# Patient Record
Sex: MALE
[De-identification: names, ages, dates, MRNs, and addresses within clinical notes are randomized per-mention and may not be internally consistent; named-entity substitution may affect disease eponyms.]

---

## 2018-10-18 NOTE — RAD REPORT
EXAM DESCRIPTION:  MRI - Brain Wo Cont - 10/18/2018 1:01 pm

 

CLINICAL HISTORY:  Facial drooping, stroke-like symptoms

 

COMPARISON:  CT head same date

 

TECHNIQUE:  Sagittal T1-weighted images were obtained along with axial PD, heavily T2-weighted and T2
-FLAIR images. Axial DWI and ADC mapping sequences were also obtained along with coronal heavily T2-w
eighted images.

 

FINDINGS:  No intracranial hemorrhage, mass or acute infarction. There is no edema or shift of midlin
e structures. No extra-axial fluid collections. Gray-matter/white matter junction is preserved. Signa
l voids are seen as a normal finding in the major intracranial vessels. No measurable atrophy or 
jennifer ischemic change. Ventricles are normal. No globe or orbital content abnormality seen.

 

No sella or supra sella abnormality. No tonsillar ectopia.

 

Mastoid air cells and paranasal sinuses are clear.

 

Exam has limitation due to motion. Multiple sequences had to be repeated.

 

IMPRESSION:  No acute infarction changes seen. No hemorrhage, mass, edema or other acute intracranial
 finding.

## 2018-10-18 NOTE — RAD REPORT
EXAM DESCRIPTION:  CT - Head Brain Wo Cont - 10/18/2018 10:39 am

 

CLINICAL HISTORY:  Hypertension, facial droop

 

COMPARISON:  None.

 

TECHNIQUE:  Axial 5 mm thick images of the head were obtained without IV contrast.

 

All CT scans are performed using dose optimization technique as appropriate and may include automated
 exposure control or mA/KV adjustment according to patient size.

 

FINDINGS:  No intracranial hemorrhage, mass, edema or shift of mid-line structures. No acute infarcti
on changes seen. No cortical edema or sulcal effacement. No significant atrophy or chronic ischemic c
hange. Ventricles are normal.

 

Mastoid air cells and visualized portions of the paranasal sinuses are clear.

 

No acute bony findings.

 

 

IMPRESSION:  Negative non-contrast CT head examination for acute finding.

## 2018-10-18 NOTE — ER
Nurse's Notes                                                                                     

 [facility]                                                                

Name: Gregory Garvin                                                                            

Age: 58 yrs                                                                                       

Sex: Male                                                                                         

: 1960                                                                                   

MRN: [id number]                                                                                   

Arrival Date: 10/18/2018                                                                          

Time: 10:24                                                                                       

Account#: [id number]                                                                            

Bed 4                                                                                             

Private MD:                                                                                       

Diagnosis: Subconjunctival Hemorrhage;Hypertension                                                

                                                                                                  

Presentation:                                                                                     

10/18                                                                                             

10:29 Presenting complaint: EMS states: Patient's supervisor pulled him form work because of  ss  

      redness to R sclera. Pt has no complaints at this time. EMS reported R sided facial         

      droop, but on arrival to ED, there was no drooping noted. Transition of care: patient       

      was not received from another setting of care. Onset of symptoms was 2018.      

      Risk Assessment: Do you want to hurt yourself or someone else? Patient reports no           

      desire to harm self or others. Initial Sepsis Screen: Does the patient meet any 2           

      criteria? HR > 90 bpm. Does the patient have a suspected source of infection? No.           

      Patient's initial sepsis screen is negative. Note PT reports he woke up this morning        

      with the redness to his sclera. Care prior to arrival: None.                                

10:29 Method Of Arrival: EMS: [facility]                                                    ss  

10:29 Acuity: MARTIN 3                                                                           ss  

                                                                                                  

Historical:                                                                                       

- Allergies:                                                                                      

10:28 NKA;                                                                                    iw  

- PMHx:                                                                                           

10:28 Diabetes - NIDDM; Hypertension; Asthma;                                                 iw  

- PSHx:                                                                                           

10:28 None;                                                                                   iw  

                                                                                                  

- Immunization history:: Adult Immunizations not immunized.                                       

- Social history:: Smoking status: Patient/guardian denies using tobacco.                         

- Ebola Screening: : Patient negative for fever greater than or equal to 101.5 degrees            

  Fahrenheit, and additional compatible Ebola Virus Disease symptoms Patient denies               

  exposure to infectious person Patient denies travel to an Ebola-affected area in the            

  21 days before illness onset No symptoms or risks identified at this time.                      

- Family history:: not pertinent.                                                                 

- Hospitalizations: : No recent hospitalization is reported.                                      

                                                                                                  

                                                                                                  

Screening:                                                                                        

10:41 Abuse screen: Denies threats or abuse. Denies injuries from another. Nutritional        iw  

      screening: No deficits noted. Tuberculosis screening: No symptoms or risk factors           

      identified. Fall Risk IV access (20 points).                                                

                                                                                                  

Assessment:                                                                                       

10:32 General: Appears in no apparent distress. Behavior is calm, cooperative. Pain: Denies   iw  

      pain. Neuro: Level of Consciousness is awake, alert, obeys commands, Oriented to            

      person, place, time, situation. Cardiovascular: Capillary refill < 3 seconds in             

      bilateral fingers Patient's skin is warm and dry. Respiratory: Respiratory effort is        

      even, unlabored, Respiratory pattern is regular, symmetrical. GI: Abdomen is                

      non-distended. EENT: Eyes Sclera/Cornea are reddened in outer aspect of conjuctiva of       

      right eye, iris of right eye and inner aspect of conjuctiva of right eye. Derm: Skin is     

      intact, is healthy with good turgor. Musculoskeletal: Range of motion: intact in all        

      extremities.                                                                                

11:31 Reassessment: Patient appears in no apparent distress at this time. Patient and/or      iw  

      family updated on plan of care and expected duration. Pain level reassessed. Patient is     

      alert, oriented x 3, equal unlabored respirations, skin warm/dry/pink. Patient denies       

      pain at this time.                                                                          

12:30 Reassessment: Patient appears in no apparent distress at this time. Patient and/or      iw  

      family updated on plan of care and expected duration. Pain level reassessed. Patient is     

      alert, oriented x 3, equal unlabored respirations, skin warm/dry/pink. pt transported       

      to [facility], with tech.                                                                          

                                                                                                  

Vital Signs:                                                                                      

10:24  / 74; Pulse 114; Resp 18 S; Pulse Ox 99% ; Weight 80.29 kg; Height 5 ft. 8 in.   iw  

      (172.72 cm); Pain 0/10;                                                                     

10:29 Temp 98.6(TE);                                                                          ss  

10:30 Pulse 101;                                                                              iw  

10:42 Pulse 97;                                                                               iw  

11:11  / 84; Pulse 91; Resp 16; Pulse Ox 97% on R/A; Pain 0/10;                         iw  

10:24 Body Mass Index 26.91 (80.29 kg, 172.72 cm)                                               

                                                                                                  

ED Course:                                                                                        

10:24 Patient arrived in ED.                                                                  iw  

10:24 Claus Yost MD is Attending Physician.                                                rn  

10:29 Arm band placed on right wrist.                                                         ss  

10:35 Triage completed.                                                                       ss  

10:35 Patient has correct armband on for positive identification.                             iw  

10:35 Inserted saline lock: 20 gauge in left antecubital area, using aseptic technique.       ss  

      ,using aseptic technique. insertion by E[facility] Anders tech Blood collected.                       

10:36 CT completed. Patient tolerated procedure well. Patient moved to CT via wheelchair.     sj  

      Patient moved back from CT.                                                                 

10:37 CT Head Brain wo Cont In Process Unspecified.                                           EDMS

11:11 Pina Macedo, RN is Primary Nurse.                                                   iw  

11:12 EKG done, by EKG tech. reviewed by Claus Yost MD.                                      dt2 

12:45 Brain Wo Cont In Process Unspecified.                                                   EDMS

12:45 Patient moved to MRI via wheelchair.                                                      

13:18 MRI completed. Patient tolerated well. Patient moved back from MRI.                     em2 

13:32 No provider procedures requiring assistance completed. IV discontinued, intact,         iw  

      bleeding controlled, No redness/swelling at site. Pressure dressing applied.                

                                                                                                  

Administered Medications:                                                                         

10:42 Drug: NS 0.9% 1000 ml Route: IV; Rate: 1000 ml; Site: left antecubital;                   

                                                                                                  

                                                                                                  

Point of Care Testing:                                                                            

      Blood Glucose:                                                                              

10:29 Blood Glucose: 253 mg/dL;                                                                 

      Ranges:                                                                                     

                                                                                                  

Outcome:                                                                                          

13:16 Discharge ordered by MD.                                                                rn  

13:32 Discharged to home ambulatory.                                                          iw  

13:32 Condition: good                                                                             

13:32 Discharge instructions given to patient, Instructed on discharge instructions, follow       

      up and referral plans. Demonstrated understanding of instructions, follow-up care.          

13:34 Patient left the ED.                                                                    iw  

                                                                                                  

Signatures:                                                                                       

Dispatcher [employer]                           EDMS                                                 

Yfn Mcfarland, RN                         RN                                                      

Marylou Gill Susan                                                                                    

Pina Macedo, RN                     RN   iw                                                   

Claus Yost MD MD rn Smirch, Shelby, RN RN ss Montes, Enrique                              em2                                                  

Samreen Corea                             dt2                                                  

                                                                                                  

**************************************************************************************************

## 2018-10-18 NOTE — EDPHYS
Physician Documentation                                                                           

 [facility]                                                                

Name: Gregory Garvin                                                                            

Age: 58 yrs                                                                                       

Sex: Male                                                                                         

: 1960                                                                                   

MRN: [id number]                                                                                   

Arrival Date: 10/18/2018                                                                          

Time: 10:24                                                                                       

Account#: [id number]                                                                            

Bed 4                                                                                             

Private MD:                                                                                       

ED Physician Claus Yost                                                                         

HPI:                                                                                              

10/18                                                                                             

10:40 This 58 yrs old  Male presents to ER via EMS with complaints of eye redness.    rn  

10:40 The patient is experiencing redness, The patient sustained None. to the right eye,      rn  

      caused by an unknown mechanism. Onset: The symptoms/episode began/occurred this             

      morning. Duration: the symptoms are continuous. Aggravated by nothing. Alleviated by        

      nothing. Associated signs and symptoms: Pertinent positives: None. Pertinent negatives:     

      fever, headache. Severity of symptoms: At their worst the symptoms were mild in the         

      emergency department the symptoms are unchanged. The patient has not experienced            

      similar symptoms in the past. Reports woke up today with red eye, otherwise                 

      asymptomatic, went to work, boss saw his eye, had BP checked and was high, called for       

      ambulance, patient denies trauma, no vision changes, EMS reports "very subtle facial        

      droop", patient denies syncope/speech problem/focal weakness or numbness..                  

                                                                                                  

Historical:                                                                                       

- Allergies:                                                                                      

10:28 NKA;                                                                                    iw  

- PMHx:                                                                                           

10:28 Diabetes - NIDDM; Hypertension; Asthma;                                                 iw  

- PSHx:                                                                                           

10:28 None;                                                                                   iw  

                                                                                                  

- Immunization history:: Adult Immunizations not immunized.                                       

- Social history:: Smoking status: Patient/guardian denies using tobacco.                         

- Ebola Screening: : Patient negative for fever greater than or equal to 101.5 degrees            

  Fahrenheit, and additional compatible Ebola Virus Disease symptoms Patient denies               

  exposure to infectious person Patient denies travel to an Ebola-affected area in the            

  21 days before illness onset No symptoms or risks identified at this time.                      

- Family history:: not pertinent.                                                                 

- Hospitalizations: : No recent hospitalization is reported.                                      

                                                                                                  

                                                                                                  

ROS:                                                                                              

10:40 Constitutional: Negative for fever, chills, and weight loss, Eyes: + redness of right   rn  

      eye Neck: Negative for injury, pain, and swelling, Cardiovascular: Negative for chest       

      pain, palpitations, and edema, Respiratory: Negative for shortness of breath, cough,        

      wheezing, and pleuritic chest pain, Abdomen/GI: Negative for abdominal pain, nausea,        

      vomiting, diarrhea, and constipation, MS/Extremity: Negative for injury and deformity,      

      Skin: Negative for injury, rash, and discoloration, Neuro: Negative for headache,           

      weakness, numbness, tingling, and seizure.                                                  

                                                                                                  

Exam:                                                                                             

10:40 Visual Acuity: Visual acuity is within normal limits.                                   rn  

10:40 Constitutional:  This is a well developed, well nourished patient who is awake, alert,      

      and in no acute distress. Appears anxious Head/Face:  Normocephalic, atraumatic. Eyes:      

      + right subconjunctival hemorrhage, EOMI Neck:  Trachea midline, no thyromegaly or          

      masses palpated, and no cervical lymphadenopathy.  Supple, full range of motion without     

      nuchal rigidity, or vertebral point tenderness.  No Meningismus. Cardiovascular:            

      Regular rate and rhythm with a normal S1 and S2.  No gallops, murmurs, or rubs.  Normal     

      PMI, no JVD.  No pulse deficits. Respiratory:  Lungs have equal breath sounds               

      bilaterally, clear to auscultation and percussion.  No rales, rhonchi or wheezes noted.     

       No increased work of breathing, no retractions or nasal flaring. Abdomen/GI:  Soft,        

      non-tender, with normal bowel sounds.  No distension or tympany.  No guarding or            

      rebound.  No evidence of tenderness throughout. MS/ Extremity:  Pulses equal, no            

      cyanosis.  Neurovascular intact.  Full, normal range of motion.  Equal circumference.       

      Neuro:  Awake and alert, GCS 15, oriented to person, place, time, and situation.            

      Cranial nerves II-XII grossly intact.  Motor strength 5/5 in all extremities.  Sensory      

      grossly intact.  Cerebellar exam normal.  No facial droop noted.                            

                                                                                                  

Vital Signs:                                                                                      

10:24  / 74; Pulse 114; Resp 18 S; Pulse Ox 99% ; Weight 80.29 kg; Height 5 ft. 8 in.   iw  

      (172.72 cm); Pain 0/10;                                                                     

10:29 Temp 98.6(TE);                                                                          ss  

10:30 Pulse 101;                                                                              iw  

10:42 Pulse 97;                                                                               iw  

11:11  / 84; Pulse 91; Resp 16; Pulse Ox 97% on R/A; Pain 0/10;                         iw  

10:24 Body Mass Index 26.91 (80.29 kg, 172.72 cm)                                             iw  

                                                                                                  

MDM:                                                                                              

10:24 Patient medically screened.                                                             rn  

13:13 Differential diagnosis: TIA, hypertension, diabetes, subconjunctival hemorrhage.        rn  

13:14 Data reviewed: vital signs, nurses notes, lab test result(s), radiologic studies, and   rn  

      as a result, I will discharge patient. Counseling: I had a detailed discussion with the     

      patient and/or guardian regarding: the historical points, exam findings, and any            

      diagnostic results supporting the discharge/admit diagnosis, lab results, radiology         

      results, the need for outpatient follow up, to return to the emergency department if        

      symptoms worsen or persist or if there are any questions or concerns that arise at          

      home. Special discussion: I discussed with the patient/guardian in detail that at this      

      point there is no indication for admission to the hospital. It is understood, however,      

      that if the symptoms persist or worsen the patient needs to return immediately for          

      re-evaluation. Based on the history and exam findings, there is no indication for           

      further emergent testing or inpatient evaluation. I discussed with the patient/guardian     

      the need to see the primary care provider for further evaluation of the symptoms. ED        

      course: Labs unremarkable except for hyperglycemia, no AG, CT head and MRI negative, +      

      subconjunctival hemorrhage, will dc home with continuation of BP meds and pcp f/u. .        

                                                                                                  

10/18                                                                                             

10:26 Order name: CBC with Diff; Complete Time: 11:                                         rn  

10/18                                                                                             

10:26 Order name: Basic Metabolic Panel; Complete Time: 11:                                 rn  

10/18                                                                                             

10:26 Order name: Protime (+inr); Complete Time: 10:59                                        rn  

10/18                                                                                             

10:26 Order name: Ptt, Activated; Complete Time: 10:59                                        rn  

10/18                                                                                             

10:26 Order name: Troponin (emerg Dept Use Only); Complete Time: 11:51                        rn  

10/18                                                                                             

10:50 Order name: Manual Differential; Complete Time: 11:51                                   EDMS

10/18                                                                                             

10:26 Order name: IV Start; Complete Time: 10:42                                              rn  

10/18                                                                                             

10:26 Order name: CT Head Brain wo Cont; Complete Time: 10:59                                 rn  

10/18                                                                                             

10:26 Order name: EKG; Complete Time: 10:27                                                   rn  

10/18                                                                                             

10:26 Order name: EKG - Nurse/Tech; Complete Time: 10:43                                      rn  

10/18                                                                                             

10:26 Order name: Accucheck; Complete Time: 10:43                                             rn  

10/18                                                                                             

11:11 Order name: Brain Wo Cont; Complete Time: 13:13                                         EDMS

                                                                                                  

Administered Medications:                                                                         

10:42 Drug: NS 0.9% 1000 ml Route: IV; Rate: 1000 ml; Site: left antecubital;                   

                                                                                                  

                                                                                                  

Point of Care Testing:                                                                            

      Blood Glucose:                                                                              

10:29 Blood Glucose: 253 mg/dL;                                                                 

      Ranges:                                                                                     

      Critical Glucose Levels:Adult <50 mg/dl or >400 mg/dl  <40 mg/dl or >180 mg/dl       

Disposition:                                                                                      

10/18/18 13:16 Discharged to Home. Impression: Subconjunctival Hemorrhage, Hypertension.          

- Condition is Stable.                                                                            

- Discharge Instructions: Hypertension, Subconjunctival Hemorrhage.                               

                                                                                                  

- Medication Reconciliation Form, Thank You Letter, Antibiotic Education, Prescription            

  Opioid Use form.                                                                                

- Follow up: Private Physician; When: As needed; Reason: Recheck today's complaints,              

  Re-evaluation by your physician.                                                                

- Problem is new.                                                                                 

- Symptoms have improved.                                                                         

                                                                                                  

                                                                                                  

                                                                                                  

Signatures:                                                                                       

Dispatcher [employer]                           EDMS                                                 

Yfn Mcfarland RN RN   sg                                                   

Pina Macedo RN RN   iw                                                   

Claus Yost MD MD   rn                                                   

                                                                                                  

Corrections: (The following items were deleted from the chart)                                    

13:34 13:16 10/18/2018 13:16 Discharged to Home. Impression: Subconjunctival Hemorrhage;      iw  

      Hypertension. Condition is Stable. Forms are Medication Reconciliation Form, Thank You      

      Letter, Antibiotic Education, Prescription Opioid Use. Follow up: Private Physician;        

      When: As needed; Reason: Recheck today's complaints, Re-evaluation by your physician.       

      Problem is new. Symptoms have improved. rn                                                  

                                                                                                  

**************************************************************************************************

## 2018-10-18 NOTE — XMS REPORT
Clinical Summary

 Created on:2018



Patient:Gregory Belcher

Sex:Male

:1960

External Reference #:[id number]





Demographics







 Address  [address]



   [place] [address]

 

 Home Phone  [phone]

 

 Preferred Language  English

 

 Marital Status  Unknown

 

 Taoist Affiliation  Unknown

 

 Race  White

 

 Ethnic Group   or 









Author







 Organization  [facility]

 

 Address  [address] [place] [address]

 

 Phone  [phone]









Support







 Name  Relationship  Address  Phone

 

 Unavailable  Unavailable  Unavailable  [phone]

 

 Unavailable  Unavailable  Unavailable  Unavailable









Care Team Providers







 Name  Role  Phone

 

 Unavailable  Primary Care Provider  Unavailable









Allergies

No Known Allergies



Current Medications







 Prescription  Sig.  Disp.  Refills  Start Date  End Date  Status

 

 metFORMIN  Take by mouth 2          Active



 (GLUCOPHAGE) 500 MG  (two) times daily          



 tabletIndications:  with breakfast          



 type 2 diabetes  and dinner Pt          



 mellitus  does not know          



   what dose .          

 

 aspirin 81 MG  Take 1 tablet (81  90 tablet  0  2018  Active



 chewable tablet  mg total) by          



   mouth daily.          

 

 carvedilol (COREG)  Take 1 tablet  180 tablet  1  2018  
Active



 3.125 MG tablet  (3.125 mg total)          



   by mouth 2 (two)          



   times daily.          

 

 digoxin (LANOXIN)  Take 1 tablet  90 tablet  1  2018  Active



 0.125 MG tablet  (125 mcg total)          



   by mouth daily.          

 

 furosemide (LASIX)  Take 1 tablet (20  90 tablet  1  2018  
Active



 20 MG tablet  mg total) by          



   mouth daily.          







Active Problems







 Problem  Noted Date

 

 Congestive heart failure (CHF) (HCC)  2017







Encounters







 Date  Type  Specialty  Care Team  Description

 

 2018  Procedure Pass      

 

 2018  Surgery    Dangelo  R & L HEART CATH



       MD Alexandra  ONLY - NO ANGIOS

 

 2017 -  Hospital Encounter  Intensive Care  Evette,  Acute systolic



 2018      MD Kevin  congestive heart



         failure (HCC)



         (Primary Dx);NSTEMI



         (non-ST elevated



         myocardial



         infarction)



         ([facility]);Type 2



         diabetes mellitus



         without



         complication,



         without long-term



         current use of



         insulin



         ([facility]);Cardiomyopathy



         , unspecified type



         ([facility])

 

 2017  Orders Only  General Internal    



     Medicine    



after 10/17/2017



Social History







 Tobacco Use  Types  Packs/Day  Years Used  Date

 

 Never Smoker        









 Smokeless Tobacco: Never Used      









 Sex Assigned at Birth  Date Recorded

 

 Not on file  







Last Filed Vital Signs







 Vital Sign  Reading  Time Taken

 

 Blood Pressure  91/62  2018  7:40 PM CST

 

 Pulse  110  2018  7:40 PM CST

 

 Temperature  37.4 C (99.4 F)  2018  7:40 PM CST

 

 Respiratory Rate  22  2018  7:40 PM CST

 

 Oxygen Saturation  95%  2018  7:40 PM CST

 

 Inhaled Oxygen Concentration  -  -

 

 Weight  71.1 kg (156 lb 12 oz)  2018  3:00 AM CST

 

 Height  170.2 cm (5' 7")  2017  6:00 PM CST

 

 Body Mass Index  24.55  2018  3:00 AM CST







Plan of Treatment

Not on file



Implants







 Implanted  Type  Area    Device  Expiration  Model /



         Identifier  Date  Serial /



             Lot

 

 Closure Sys Perclose Progl 6fr 01019-99 - [id number]  Cardiovascular  N/A:  
RITCHIE  [id number]  2019  45124-83 /



 Implanted: Qty: 1 on 2018 by Alexandra Pierson MD Groin  LAB:VASC DEV   
    /







Procedures







 Procedure Name  Priority  Date/Time  Associated Diagnosis  Comments

 

 R & L HEART CATH ONLY -    2018  2:30 PM CST  chest pain  



 NO ANGIOS        



after 10/17/2017



Results

EKG-SCANNED (2018 12:10 PM)RHYTHM STRIP - SCAN (2018 12:10 PM)
VASCULAR DIAGRAM -SCAN (2018 12:10 PM)POC-Glucose meter (2018  5:46 
PM)Only the most recent of21 resultswithin the time period is included.





 Component  Value  Ref Range

 

 POC-Glucose Meter  142 (H)Comment: TESTED AT [address]  70 - 
110 mg/dL



   [place] [id number]  









 Specimen  Performing Laboratory

 

 Blood  [facility] [address] [address]



CBC with platelet count + automated diff (2018  4:36 AM)Only the most 
recent of7 resultswithin the time period is included.





 Component  Value  Ref Range

 

 WBC  7.8  3.5 - 10.5 K/L

 

 RBC  4.21 (L)  4.63 - 6.08 M/L

 

 Hemoglobin  12.5 (L)  13.7 - 17.5 GM/DL

 

 Hematocrit  37.7 (L)  40.1 - 51.0 %

 

 MCV  89.5  79.0 - 92.2 fL

 

 MCH  29.7  25.7 - 32.2 pg

 

 MCHC  33.2  32.3 - 36.5 GM/DL

 

 RDW  14.8 (H)  11.6 - 14.4 %

 

 Platelets  210  150 - 450 K/CU MM

 

 MPV  10.3  9.4 - 12.4 fL

 

 nRBC  0  0 - 0 /100 WBC

 

 % Neutros  69  %

 

 % Lymphs  16  %

 

 % Monos  9  %

 

 % Eos  1  %

 

 % Baso  1  %

 

 # Neutros  5.38  1.78 - 5.38 K/L

 

 # Lymphs  1.25 (L)  1.32 - 3.57 K/L

 

 # Monos  0.66  0.30 - 0.82 K/L

 

 # Eos  0.06  0.04 - 0.54 K/L

 

 # Baso  0.09 (H)  0.01 - 0.08 K/L

 

 Immature Granulocytes-Relative  4 (H)  0 - 1 %









 Specimen  Performing Laboratory

 

 Blood  [address] [address]



CBC with platelet count + automated diff (2018  4:36 AM)Only the most 
recent of7 resultswithin the time period is included.





 Specimen  Performing Laboratory

 

 Blood  









 Narrative

 

 The following orders were created for panel order CBC with platelet count + 
automated



 diff.







 Procedure



 Abnormality Status 







 ---------



 ----------- ------ 







 CBC with platelet count ...[[id number]]AbnormalFinal



 result 







  







 Please view results for these tests on the individual orders.



Magnesium (2018  4:36 AM)Only the most recent of7 resultswithin the time 
period is included.





 Component  Value  Ref Range

 

 Magnesium  1.9  1.6 - 2.6 mg/dL









 Specimen  Performing Laboratory

 

 Blood  [address] [address]



Basic metabolic panel (2018  4:36 AM)Only the most recent of7 
resultswithin the time period is included.





 Component  Value  Ref Range

 

 Sodium  135 (L)  136 - 145 meq/L

 

 Potassium  3.8  3.5 - 5.1 meq/L

 

 Chloride  106  98 - 107 meq/L

 

 CO2  22  22 - 29 meq/L

 

 BUN  7  7 - 21 mg/dL

 

 Creatinine  0.49 (L)  0.57 - 1.25 mg/dL

 

 Glucose  101  70 - 105 mg/dL

 

 Calcium  8.4  8.4 - 10.2 mg/dL

 

 EGFR  177Comment: ESTIMATED GFR IS NOT AS ACCURATE AS  mL/min/1.73 sq m



   CREATININE CLEARANCE IN PREDICTING GLOMERULAR FILTRATION  



   RATE. ESTIMATED GFR IS NOT APPLICABLE FOR DIALYSIS  



   PATIENTS.  









 Specimen  Performing Laboratory

 

 Blood  [facility] [address] [address]



MR cardiac without &amp; with IV contrast (2018  4:29 PM)





 Specimen  Performing Laboratory

 

   [employer]









 Narrative

 

 FINAL REPORT







  







 PATIENT ID: [id number]







  







 Cardiac MRI dated 2017







  







 INDICATION: This is a 57 year-old male with known ischemic 







 cardiomyopathy presents for assessment. Recent angiography 







 demonstrate no coronary artery disease. This study is performed in 







 order to quantitate left ventricular function, and to determine 







 myocardial viability and damage.







  







 TECHNIQUE: Julio [employer] scanner. Morphologic and dynamic cine 







 imaging were performed in multiple projections before and after 







 contrast administration.Thereafter, gadolinium was administered, 







 which was followed by viability/scar imaging.Finally, flow 







 quantification sequences were performed to determine the degree of 







 valvular dysfunction.







  







 Please refer to the contrast sheet scanned in the EPIC system for the 







 amount and route of contrast given.







  







 Scanning blood pressure was 90/66. Patient weighs 155 pounds, with 







 height of 67 inches. Body surface area is approximately 1.82 sq m.







  







 FINDINGS: The chest wall and mediastinum appears unremarkable.The 







 pericardium and pulmonary arteries appear normal; no pericardial 







 effusion is identified in the central pulmonary artery is normal in 







 calibre. Limited imaging through the lungs reveals no gross 







 abnormalities; some dependent changes are seen in the lung bases.







 The cardiac chambers demonstrate normal atrioventricular and 







 ventriculoarterial concordance, and systemic and pulmonary venous 







 return.







  







 The thoracic aorta is normal in course, calibre, and contour. There 







 is no evidence of acute aortic pathology, such as dissection, 







 intramural hematoma, or contained rupture. 







  







 The left ventricle is normal and enlarged with severely global 







 hypokinesis/akinesis.Quantitative values are as follows: QZM=546 







 cc; LTS=142 cc; stroke volume=63 cc; and ejection fraction=21%.







 Calculated absolute cardiac output=6.2 liters/min.Absolute left 







 ventricular ylbr=410 grams. Index YOZYE=172 cc/sq m confirming 







 left ventricular enlargement.







  







 No evidence of hypertrophic cardiomyopathy or left ventricular 







 noncompaction noted.







  







 The right ventricle is normal in size with mild systolic dysfunction. 







 Quantitative values are as follows: EGJ=266 cc; ESV=97 cc; and 







 ejection fraction=34%.







  







 Cine imaging and flow quantification unremarkable mitral and aortic 







 valve function. Trace tricuspid regurgitation is present.







  







 Viability/scar imaging reveals no evidence of prior myocardial 







 infarction. However, there is some subtle linear mid myocardial 







 hyperenhancement identified in the basal septum, consistent with 







 interstitial fibrosis, a nonspecific finding, commonly seen in 







 patient with dilated nonischemic cardiomyopathy. Furthermore, this is 







 consistent with raised native myocardial T1 in the experimental T1 







 mapping sequence.







  







 There is substantial reduction in left ventricular long axis strain, 







 -4.3% (normal MRI reference value for male is 16.5 + / - 2.2%).







  







 Ventricular thrombus is not present.







  







 Left atrial enlargement is identified.







  







 CONCLUSIONS:







  







 1. The left ventricle is enlarged with severe global systolic 







 dysfunction. Ejection fraction is quantified to be 21%. 







 Quantitative left ventricular functional values are as described 







 above.







  







 Viability/scar imaging is normal.There is no evidence of prior 







 myocardial damage. No imaging evidence to suggest left ventricular 







 noncompaction.







  







 There is mid myocardial hyperenhancement identified best seen in the 







 basal septum indicating interstitial fibrosis, nonspecific finding in 







 patient with cardiomyopathy.







  







 Substantial reduction in left ventricular long axis strain.







  







 2.The right ventricle is normal in size with overall mild systolic 







 dysfunction. 







  







 Quantitative right ventricular functional veins as described above.







  







 3.Normal aortic and mitral valvular function.







  







 4.Left atrial enlargement.







  







 Signed: Angel Fink MD







 Report Verified Date/Time:2018 16:35:20







  







 Reading Location: [facility] P047 Cardiology MRI







 Electronically signed by: ANGEL FINK M.D. on 2018 04:35 
PM







 









 Procedure Note

 

 Interface, External Ris In - 2018  4:37 PM CST



FINAL REPORT



 



 PATIENT ID:   [id number]



 



 Cardiac MRI dated 2017



 



 INDICATION: This is a 57 year-old male with known ischemic



 cardiomyopathy presents for assessment. Recent angiography



 demonstrate no coronary artery disease. This study is performed in



 order to quantitate left ventricular function, and to determine



 myocardial viability and damage.



 



 TECHNIQUE: Julio [employer]  MRI scanner. Morphologic and dynamic cine



 imaging were performed in multiple projections before and after



 contrast administration.  Thereafter, gadolinium was administered,



 which was followed by viability/scar imaging.  Finally, flow



 quantification sequences were performed to determine the degree of



 valvular dysfunction.



 



 Please refer to the contrast sheet scanned in the EPIC system for the



 amount and route of contrast given.



 



 Scanning blood pressure was 90/66. Patient weighs 155 pounds, with



 height of 67 inches. Body surface area is approximately 1.82 sq m.



 



 FINDINGS: The chest wall and mediastinum appears unremarkable.  The



 pericardium and pulmonary arteries appear normal; no pericardial



 effusion is identified in the central pulmonary artery is normal in



 calibre. Limited imaging through the lungs reveals no gross



 abnormalities; some dependent changes are seen in the lung bases.



 The cardiac chambers demonstrate normal atrioventricular and



 ventriculoarterial concordance, and systemic and pulmonary venous



 return.



 



 The thoracic aorta is normal in course, calibre, and contour. There



 is no evidence of acute aortic pathology, such as dissection,



 intramural hematoma, or contained rupture.



 



 The left ventricle is normal and enlarged with severely global



 hypokinesis/akinesis.  Quantitative values are as follows: GXZ=238



 cc; JCS=455 cc; stroke volume=63 cc; and ejection fraction=21%.



 Calculated absolute cardiac output=6.2 liters/min.  Absolute left



 ventricular rofi=843 grams. Index JUEXV=659 cc/sq m confirming



 left ventricular enlargement.



 



 No evidence of hypertrophic cardiomyopathy or left ventricular



 noncompaction noted.



 



 The right ventricle is normal in size with mild systolic dysfunction.



 Quantitative values are as follows: MLN=660 cc; ESV=97 cc; and



 ejection fraction=34%.



 



 Cine imaging and flow quantification unremarkable mitral and aortic



 valve function. Trace tricuspid regurgitation is present.



 



 Viability/scar imaging reveals no evidence of prior myocardial



 infarction. However, there is some subtle linear mid myocardial



 hyperenhancement identified in the basal septum, consistent with



 interstitial fibrosis, a nonspecific finding, commonly seen in



 patient with dilated nonischemic cardiomyopathy. Furthermore, this is



 consistent with raised native myocardial T1 in the experimental T1



 mapping sequence.



 



 There is substantial reduction in left ventricular long axis strain,



 -4.3% (normal MRI reference value for male is 16.5 + / - 2.2%).



 



 Ventricular thrombus is not present.



 



 Left atrial enlargement is identified.



 



 CONCLUSIONS:



 



 1. The left ventricle is enlarged with severe global systolic



 dysfunction. Ejection fraction is quantified to be 21%.



 Quantitative left ventricular functional values are as described



 above.



 



 Viability/scar imaging is normal.  There is no evidence of prior



 myocardial damage. No imaging evidence to suggest left ventricular



 noncompaction.



 



 There is mid myocardial hyperenhancement identified best seen in the



 basal septum indicating interstitial fibrosis, nonspecific finding in



 patient with cardiomyopathy.



 



 Substantial reduction in left ventricular long axis strain.



 



 2.  The right ventricle is normal in size with overall mild systolic



 dysfunction.



 



 Quantitative right ventricular functional veins as described above.



 



 3.  Normal aortic and mitral valvular function.



 



 4.  Left atrial enlargement.



 



 Signed: Angel Fink MD



 Report Verified Date/Time:  2018 16:35:20



 



 Reading Location: [facility] P047 Cardiology MRI



     Electronically signed by: ANGEL FINK M.D. on 2018 04:35 PM



 



CARDIAC CATH REPORT - SCAN (2018  8:40 PM)ECHOCARDIOGRAM REPORT - SCAN (  2:50 PM)Transthoracic 2D echo w/ doppler (cw/pw/color) (2018 11:
02 AM)





 Component  Value  Ref Range

 

 Ejection Fraction    









 Specimen  Performing Laboratory

 

   [facility] ECHO HEARTLAB MKCKESSON \[facility] [place] [facility]\""









 Narrative

 

 Transthoracic Echocardiography Report (TTE)







  







  Demographics







  







  Patient Name BELCHER, Date of Study 2018







 GREGORY







  







  [id number]




 Male







  







  Visit Number [id number]








  







  [id number] Room Number C721







  Number







  







  Date of Birth1960Referring Physician Kevin Alas MD







  







  Age58 year(s)Sonographer 
Genoveva Navarro







 




 [facility]







  







 Interpreting




 [facility]







 Physician




  Kevin Alas MD







  







 Procedure







  







  Type of







  Study TTE procedure:2DECHO W DOPPLER(CW/PW/COLOR) (ASAP)







  







 Indications:Shortness of breath.







  







 Clinical History







 HGB 14.7







 HCT 45.3 %







 CHF, DM







  







 Height: 67 inches Weight: 72.12 kg (159 lbs) BSA: 1.83 m^2 BMI: 24.9 kg/m^2







  







 HR: 97 bpm BP: 89/66 mmHg







  







  Summary







  Aortic root size (SInus of Valsalva diameter) is mildly dilated .







  The right ventricular chamber size and systolic function are within normal







  limits.







  Unable to estimate peak systolic PA pressure; inadequate TR velocity







  signal.







  No evidence of LV hypertrophy.







  The left ventricle is chamber size (by vol index) is severely enlarged







  (male - LVED vol >100ml/m2).







  The inferior and inferoseptal walls are akinetic.







  Global LV systolic function severely reduced .







  LVEF by Cobos's method of disk assessment is severely reduced (<20%) .







  LV diastolic function is indeterminate.







  LA size is mildly enlarged (35-41 ml/m2) .







  RA size is normal.







  Mild aortic regurgitation.







  Mild mitral regurgitation.







  Unable to estimate peak systolic PA pressure; inadequate TR velocity







  signal







  The estimated RA pressure by IVC dynamics 0-5mmHg .







  







  Signature







  







  ----------------------------------------------------------------







  Electronically signed by Kevin Alas MD(Interpreting







  physician) on 2018 02:29 PM







  ----------------------------------------------------------------







  







  Findings







  







 Technical Quality: Technically adequate exam.







  







  Rhythm/BPSinus tachycardia during the exam.







  







  Left Ventricle No evidence of LV hypertrophy.







 The left ventricle is chamber 
size



 (by vol index)







 is severely enlarged (male - 
LVED vol



 >100ml/m2).







 The inferior and inferoseptal 
walls



 are akinetic.







 Global LV systolic function 
severely



 reduced .







 LVEF by Cobos's method of 
disk



 assessment is







 severely reduced (<20%) .







 LV diastolic function is



 indeterminate.







  







  Left AtriumLA size is mildly enlarged (35-41 ml/m2) .







  







  Right VentricleThe right ventricular chamber size and systolic







 function are within normal 
limits.







  







  Right Atrium RA size is normal.







  







  Aortic Valve Mild AoV cusp thickening.







 Mild aortic regurgitation.







  







  Mitral Valve Normal MV structure.







 Mild mitral regurgitation.







  







  Tricuspid ValveTV structure is normal.







 Unable to estimate peak 
systolic PA



 pressure;







 inadequate TR velocity signal.







  







  Pulmonic Valve Normal PV structure appears normal by available







 views.







 A trace of pulmonary 
regurgitation.







  







  AortaAortic root size (SInus of Valsalva



 diameter) is







 mildly dilated .







 Proximal ascending aorta size 
is



 normal .







  







  PericardiumNo pericardial effusion is visualized.







  







  IVC/SVC/PA/PV/PleuralThe estimated RA pressure by IVC dynamics 0-5mmHg .







  







 Chambers/Structures







  







  Left Atrium







  







  LA Dimension: 3.94 cmLA Area: 22.33 cm^2







  LA Volume: 66.01 ml







  LA Vol. Index: 36 ml/m^2







  







  Left Ventricle







  







  LVIDd: 6.3 cm







  LV Septum Diastolic: 0.81 cm







  LV Septum Systolic: 5.4 cm







  LV PW Diastolic: 1.06 cm







  LVEDV Cobso's:182.24 ml







  LVESV Cobos's:162.8 ml LVEDVI: 100 ml/m^
2







  LVEF Cobos's: 10.7 % LVESVI: 89 ml/m
^2







  







 Aorta







  







  Ao Annulus: 3.57 cmAscending Aorta: 3.35 cm







  







 Doppler/Quantitative Measurements







  







  Mitral Valve







  







  MV Peak E-Wave: 0.7 m/sPeak Gradient: 
1.95 mmHg







  







  MV Austin. Peak:







  







  Tissue Doppler







  







  E' Septal Velocity: 0.08 m/s E/E': 9.14







  







  Aortic Valve







  







  Peak Velocity: 1.2 m/s Mean Velocity: 0.91 m/s







  Peak Gradient: 5.79 mmHg Mean Gradient: 3.68 mmHg







  







  AV VTI: 15.66 cm







  







  AV DVI: 0.47







  







  LVOT







  







  Peak Velocity: 0.56 m/s Peak Gradient: 1.24 mmHg







  Mean Velocity: 0.38 m/s Mean Gradient: 0.7 mmHg







  LVOT 
VTI:



 7.37 cm







 









 Procedure Note

 

 Interface, External Ris In - 2018  2:29 PM CST



Transthoracic Echocardiography Report (TTE)



 



  Demographics



 



  Patient Name   YE,         Date of Study       2018



                 GREGORY



 



  MRN            [id number]        Gender              Male



 



  Visit Number   [id number]      Race                



 



  Accession      [id number]       Room Number         C721



  Number



 



  Date of Birth  1960      Referring Physician Kevin Alas MD



 



  Age            57 year(s)      Sonographer         Genoveva Navarro



                                                     [facility]



 



                                 Interpreting        [facility]



                                 Physician           Kevin Alas MD



 



 Procedure



 



  Type of



  Study     TTE procedure:2DECHO W DOPPLER(CW/PW/COLOR) (ASAP)



 



 Indications:Shortness of breath.



 



 Clinical History



 HGB 14.7



 HCT 45.3 %



 CHF, DM



 



 Height: 67 inches Weight: 72.12 kg (159 lbs) BSA: 1.83 m^2 BMI: 24.9 kg/m^2



 



 HR: 97 bpm BP: 89/66 mmHg



 



  Summary



  Aortic root size (SInus of Valsalva diameter) is mildly dilated .



  The right ventricular chamber size and systolic function are within normal



  limits.



  Unable to estimate peak systolic PA pressure; inadequate TR velocity



  signal.



  No evidence of LV hypertrophy.



  The left ventricle is chamber size (by vol index) is severely enlarged



  (male - LVED vol >100ml/m2).



  The inferior and inferoseptal walls are akinetic.



  Global LV systolic function severely reduced .



  LVEF by Cobos's method of disk assessment is severely reduced (<20%) .



  LV diastolic function is indeterminate.



  LA size is mildly enlarged (35-41 ml/m2) .



  RA size is normal.



  Mild aortic regurgitation.



  Mild mitral regurgitation.



  Unable to estimate peak systolic PA pressure; inadequate TR velocity



  signal



  The estimated RA pressure by IVC dynamics 0-5mmHg .



 



  Signature



 



  ----------------------------------------------------------------



  Electronically signed by Kevin Alas MD(Interpreting



  physician) on 2018 02:29 PM



  ----------------------------------------------------------------



 



  Findings



 



 Technical Quality: Technically adequate exam.



 



  Rhythm/BP              Sinus tachycardia during the exam.



 



  Left Ventricle         No evidence of LV hypertrophy.



                         The left ventricle is chamber size (by vol index)



                         is severely enlarged (male - LVED vol >100ml/m2).



                         The inferior and inferoseptal walls are akinetic.



                         Global LV systolic function severely reduced .



                         LVEF by Cobos's method of disk assessment is



                         severely reduced (<20%) .



                         LV diastolic function is indeterminate.



 



  Left Atrium            LA size is mildly enlarged (35-41 ml/m2) .



 



  Right Ventricle        The right ventricular chamber size and systolic



                         function are within normal limits.



 



  Right Atrium           RA size is normal.



 



  Aortic Valve           Mild AoV cusp thickening.



                         Mild aortic regurgitation.



 



  Mitral Valve           Normal MV structure.



                         Mild mitral regurgitation.



 



  Tricuspid Valve        TV structure is normal.



                         Unable to estimate peak systolic PA pressure;



                         inadequate TR velocity signal.



 



  Pulmonic Valve         Normal PV structure appears normal by available



                         views.



                         A trace of pulmonary regurgitation.



 



  Aorta                  Aortic root size (SInus of Valsalva diameter) is



                         mildly dilated .



                         Proximal ascending aorta size is normal .



 



  Pericardium            No pericardial effusion is visualized.



 



  IVC/SVC/PA/PV/Pleural  The estimated RA pressure by IVC dynamics 0-5mmHg .



 



 Chambers/Structures



 



  Left Atrium



 



  LA Dimension: 3.94 cm                  LA Area: 22.33 cm^2



  LA Volume: 66.01 ml



  LA Vol. Index: 36 ml/m^2



 



  Left Ventricle



 



  LVIDd: 6.3 cm



  LV Septum Diastolic: 0.81 cm



  LV Septum Systolic: 5.4 cm



  LV PW Diastolic: 1.06 cm



  LVEDV Cobos's:182.24 ml



  LVESV Cobos's:162.8 ml                   LVEDVI: 100 ml/m^2



  LVEF Cobos's: 10.7 %                     LVESVI: 89 ml/m^2



 



 Aorta



 



  Ao Annulus: 3.57 cm              Ascending Aorta: 3.35 cm



 



 Doppler/Quantitative Measurements



 



  Mitral Valve



 



  MV Peak E-Wave: 0.7 m/s                  Peak Gradient: 1.95 mmHg



 



  MV Austin. Peak:



 



  Tissue Doppler



 



  E' Septal Velocity: 0.08 m/s             E/E': 9.14



 



  Aortic Valve



 



  Peak Velocity: 1.2 m/s               Mean Velocity: 0.91 m/s



  Peak Gradient: 5.79 mmHg             Mean Gradient: 3.68 mmHg



 



  AV VTI: 15.66 cm



 



  AV DVI: 0.47



 



  LVOT



 



  Peak Velocity: 0.56 m/s             Peak Gradient: 1.24 mmHg



  Mean Velocity: 0.38 m/s             Mean Gradient: 0.7 mmHg



                                      LVOT VTI: 7.37 cm



 



Manual Differential (2018  4:08 AM)Only the most recent of2 resultswithin 
the time period is included.





 Component  Value  Ref Range

 

 Total Counted    

 

 WBC Morphology  Normal  

 

 Platelet Morphology  Normal  

 

 RBC Morphology  Normal  









 Specimen  Performing Laboratory

 

 Blood  [address] [address]



PT/aPTT (2018  4:08 AM)





 Component  Value  Ref Range

 

 Protime  13.1  11.7 - 14.7 seconds

 

 INR  1.0  <=5.9

 

 PTT  28.6  22.5 - 36.0 seconds









 Specimen  Performing Laboratory

 

 Blood  [address] [address]









 Narrative

 

  







 RECOMMENDED COUMADIN/WARFARIN INR THERAPY RANGES







 STANDARD DOSE: 2.0 - 3.0 Includes: PROPHYLAXIS for venous thrombosis, 
systemic



 embolization; TREATMENT for venous thrombosis and/or pulmonary embolus.







 HIGH RISK: Target INR is 2.5-3.5 for patients with mechanical heart valves.



Lipid panel (2018  4:08 AM)





 Component  Value  Ref Range

 

 Triglycerides  222  mg/dL

 

 Cholesterol  214  mg/dL

 

 HDL  29  mg/dL

 

 LDL Calculated  141  mg/dL









 Specimen  Performing Laboratory

 

 Blood  [address] [address]









 Narrative

 

  







 Triglyceride Reference Range:







  Low Risk <150







  [id number]







  High Risk 200-499







  Very High Risk>=500







  







 Cholesterol Reference Range:







  Low Risk <200







  [id number] 







  High Risk>240







  







 HDL Cholesterol Reference Range:







  Low Risk >=60







  High Risk <40







  







 LDL Cholesterol Reference Range:







  Optimal<100







  Near [id number]







  [id number]







  Mres113-211







  Very High >=190



Protein, 24 hour urine (2018 12:16 AM)





 Component  Value  Ref Range

 

 Protein, 24hr Urine  <98  0 - 300 mg/24hr

 

 Volume, Urine  1400  ml

 

 Protein, Urine  <7  0 - 14 mg/dL









 Specimen  Performing Laboratory

 

 Urine - Urine, Voided  [address] [address]



Microalbumin, 24 hour urine (2018 12:16 AM)





 Component  Value  Ref Range

 

 Volume, Urine  1400  ml

 

 Microalbumin, Urine  <0.5  mg/dL

 

 Microalb, 24H Ur  <7  0 - 30 mg/24 hrs









 Specimen  Performing Laboratory

 

 Urine - Urine, Voided  [address] [address]



Urinalysis, Routine (2018  1:17 AM)





 Component  Value  Ref Range

 

 Color, UA  Yellow  

 

 Clarity, UA  Clear  

 

 Specific Gravity, UA  1.017  1.001 - 1.035

 

 pH, UA  7.0  5.0 - 8.0

 

 Protein, UA  Negative  Negative

 

 Glucose, UA  300 mg/dL (A)  Negative

 

 Ketones, UA  10 mg/dL (A)  Negative

 

 Bilirubin, UA  Negative  Negative

 

 Blood, UA  Negative  Negative

 

 Nitrite, UA  Negative  Negative

 

 Leukocytes, UA  Negative  Negative

 

 Urobilinogen, UA  2.0 (H)  0.2 - 1.0 mg/dL

 

 RBC, UA  <1  /HPF

 

 WBC, UA  1  /HPF

 

 Squam Epithel, UA  <1  /HPF

 

 Specimen Source  Urine, Voided  









 Specimen  Performing Laboratory

 

 Urine - Urine, Voided  [address] [address]



Urine culture (2018  1:17 AM)





 Component  Value  Ref Range

 

 Result    

 

 Result  10-19,000 col/mL Candida albicans (A)  









 Specimen  Performing Laboratory

 

 Urine - Urine, Voided  [address] [address]



TSH/T4 if indicated (2017  8:51 PM)





 Component  Value  Ref Range

 

 TSH  1.03  0.35 - 4.94 uIU/mL









 Specimen  Performing Laboratory

 

 Blood - [facility] [address]



Troponin I (2017  8:51 PM)





 Component  Value  Ref Range

 

 Troponin I  0.32 (HH)  0.00 - 0.03 ng/mL









 Specimen  Performing Laboratory

 

 Blood - [facility] [address]









 Narrative

 

  







 Troponin I (TnI) levels must be interpreted in the context of the presenting 
symptoms



 and the clinical findings. Elevated TnI levels indicate myocardial damage, but 
are



 not specific for ischemic heart disease. Elevated TnI levels are seen in 
patients



 with other cardiac conditions (including myocarditis and congestive heart 
failure),



 and slight TnI elevations occur in patients with other conditions, including 
sepsis,



 renal failure, acidosis, acute neurological disease, and persistent 
tachyarrhythmia.



Lactic acid, venous, whole blood (2017  8:51 PM)





 Component  Value  Ref Range

 

 Lactate, Venous  1.2Comment: Specimen moderately hemolyzed  0.5 - 2.2 mmol/L









 Specimen  Performing Laboratory

 

 Blood - Arm, [address] [address]









 Narrative

 

  







 Effective 2016: Units/Reference Range Change







 New: 0.5-2.2 mmol/LPrevious: 5-20 mg/dL



Prothrombin time/INR (2017  8:51 PM)





 Component  Value  Ref Range

 

 Protime  13.1  11.7 - 14.7 seconds

 

 INR  1.0  <=5.9









 Specimen  Performing Laboratory

 

 Blood - Arm, Right  [facility] [address]









 Narrative

 

  







 RECOMMENDED COUMADIN/WARFARIN INR THERAPY RANGES







 STANDARD DOSE: 2.0 - 3.0 Includes: PROPHYLAXIS for venous thrombosis, 
systemic



 embolization; TREATMENT for venous thrombosis and/or pulmonary embolus.







 HIGH RISK: Target INR is 2.5-3.5 for patients with mechanical heart valves.



B-type Natriuretic Factor (BNP) (2017  8:51 PM)





 Component  Value  Ref Range

 

 BNP  825 (H)  0 - 100 pg/mL









 Specimen  Performing Laboratory

 

 Blood - Arm, [address] [address]



Hemoglobin A1c (2017  8:51 PM)





 Component  Value  Ref Range

 

 Hemoglobin A1C  9.2 (H)  4.3 - 6.1 %









 Specimen  Performing Laboratory

 

 Blood - [facility], [address] [address]



Hepatic function panel (2017  8:51 PM)





 Component  Value  Ref Range

 

 Protein, Total  5.9 (L)  6.0 - 8.3 gm/dL

 

 Albumin  3.2 (L)  3.5 - 5.0 g/dL

 

 Total Bilirubin  1.1  0.2 - 1.2 mg/dL

 

 Bilirubin, Direct  0.3  0.1 - 0.5 mg/dL

 

 Alkaline Phosphatase  105  40 - 150 U/L

 

 AST  13  5 - 34 U/L

 

 ALT  37  6 - 55 U/L









 Specimen  Performing Laboratory

 

 Blood - Arm, [address] [address]



Blood culture (2017  8:50 PM)Only the most recent of2 resultswithin the 
time period is included.





 Component  Value  Ref Range

 

 Result  No growth in 5 days  









 Specimen  Performing Laboratory

 

 Blood - Arm, [address] [address]



ECG 12 lead (2017  8:06 PM)





 Specimen  Performing Laboratory

 

   GE [facility]









 Narrative

 

 Ventricular Rate 107 BPM







 Atrial Rate 107 BPM







 P-R Interval 130 ms







 QRS Duration 86 ms







 Q-T Interval 360 ms







 QTC Calculation(Bazett) 480 ms







 P Axis 35 degrees







 R Axis -20 degrees







 T Axis 127 degrees







  







 Sinus tachycardia







 ST & T wave abnormality, consider lateral ischemia







 Abnormal ECG







 No previous ECGs available







 Confirmed by EDWIN REAGAN MICHAEL ([id number]) on 2018 9:49:04 AM









 Procedure Note

 

 Interface, External Ris In - 2018  9:49 AM CST



Ventricular Rate 107 BPM



 Atrial Rate 107 BPM



 P-R Interval 130 ms



 QRS Duration 86 ms



 Q-T Interval 360 ms



 QTC Calculation(Bazett) 480 ms



 P Axis 35 degrees



 R Axis -20 degrees



 T Axis 127 degrees



 



 Sinus tachycardia



 ST & T wave abnormality, consider lateral ischemia



 Abnormal ECG



 No previous ECGs available



 Confirmed by EDWIN REAGAN MICHAEL ([id number]) on 2018 9:49:04 AM



XR chest 1 view portable / bedside (2017  7:45 PM)





 Specimen  Performing Laboratory

 

   GE RIS









 Narrative

 

 FINAL REPORT







  







 PATIENT ID: [id number]







  







  







 History: Shortness of breath.







  







 Comparison: None.







  







 Findings:







  







 A single view of the chest is submitted.







  







 The cardiac silhouette is prominent in size but magnified by low lung 







 volumes and portable technique. 







  







 There is central pulmonary vascular congestion. Bilateral 







 interstitial and patchy mid and lower lung airspace opacities may 







 reflect a combination of atelectasis and edema but pneumonitis should 







 be excluded clinically.







  







 A small right pleural effusion is suspected.







  







 There is no pneumothorax or acute bony abnormality.







  







 Signed: Emil Farooq MD







 Report Verified Date/Time:2017 21:06:26







  







 Reading Location: [address] Reading Room







 Electronically signed by: EMIL FAROOQ M.D. on 2017 09:06 
PM







 









 Procedure Note

 

 Interface, External Ris In - 2017  9:08 PM CST



FINAL REPORT



 



 PATIENT ID:   [id number]



 



 



 History: Shortness of breath.



 



 Comparison: None.



 



 Findings:



 



 A single view of the chest is submitted.



 



 The cardiac silhouette is prominent in size but magnified by low lung



 volumes and portable technique.



 



 There is central pulmonary vascular congestion. Bilateral



 interstitial and patchy mid and lower lung airspace opacities may



 reflect a combination of atelectasis and edema but pneumonitis should



 be excluded clinically.



 



 A small right pleural effusion is suspected.



 



 There is no pneumothorax or acute bony abnormality.



 



 Signed: Emil Farooq MD



 Report Verified Date/Time:  2017 21:06:26



 



 Reading Location: [address] Reading Room



       Electronically signed by: EMIL FAROOQ M.D. on 2017 09:06 PM



 



after 10/17/2017

## 2018-10-18 NOTE — XMS REPORT
Patient Summary Document

 Created on:2018



Patient:MARYANN BELCHER

Sex:Male

:1960

External Reference #:[id number]





Demographics







 Address  [address] [facility] [address]



   [place] [address]

 

 Home Phone  [phone]

 

 Work Phone  [phone]

 

 Preferred Language  Unknown

 

 Marital Status  Unknown

 

 Anabaptism Affiliation  Unknown

 

 Race  Unknown

 

 Additional Race(s)  Unavailable

 

 Ethnic Group  Unknown









Author







 Organization  [facility]

 

 Address  [address] [place] [address] [address]



   [place] [address]

 

 Phone  [phone]









Care Team Providers







 Name  Role  Phone

 

 BONY OVIEDO  Unavailable  Unavailable









Problems

This patient has no known problems.



Allergies, Adverse Reactions, Alerts

This patient has no known allergies or adverse reactions.



Medications

This patient has no known medications.



Results







 Test Description  Test Time  Test Comments  Text Results  Atomic Results  
Result Comments









 POCT-GLUCOSE METER  2018 17:53:00    









   Test Item  Value  Reference Range  Comments









 POC-GLUCOSE METER (BEAKER) (test  142 mg/dL    TESTED AT [address]



 ztxb=4659)      [facility] [address]



POCT-GLUCOSE [id number] 15:28:00





 Test Item  Value  Reference Range  Comments

 

 POC-GLUCOSE METER (BEAKER)  149 mg/dL    TESTED AT [address]



 (test pkqf=7054)      [facility] [address]



[id number] 06:40:00





 Test Item  Value  Reference Range  Comments

 

 MAGNESIUM (BEAKER) (test code=627)  1.9 mg/dL  1.6-2.6  



BASIC METABOLIC [id number] 06:40:00





 Test Item  Value  Reference Range  Comments

 

 SODIUM (BEAKER) (test  135 meq/L  136-145  



 camg=609)      

 

 POTASSIUM (BEAKER) (test  3.8 meq/L  3.5-5.1  



 code=379)      

 

 CHLORIDE (BEAKER) (test  106 meq/L    



 code=382)      

 

 CO2 (BEAKER) (test  22 meq/L  22-29  



 code=355)      

 

 BLOOD UREA NITROGEN  7 mg/dL  7-21  



 (BEAKER) (test code=354)      

 

 CREATININE (BEAKER) (test  0.49 mg/dL  0.57-1.25  



 code=358)      

 

 GLUCOSE RANDOM (BEAKER)  101 mg/dL    



 (test code=652)      

 

 CALCIUM (BEAKER) (test  8.4 mg/dL  8.4-10.2  



 code=697)      

 

 EGFR (BEAKER) (test  177 mL/min/1.73 sq m    ESTIMATED GFR IS NOT AS



 code=1092)      ACCURATE AS CREATININE



       CLEARANCE IN PREDICTING



       GLOMERULAR FILTRATION



       RATE. ESTIMATED GFR IS



       NOT APPLICABLE FOR



       DIALYSIS PATIENTS.



CBC W/PLT COUNT &amp; AUTO [id number] 05:35:00





 Test Item  Value  Reference Range  Comments

 

 WHITE BLOOD CELL COUNT (BEAKER) (test code=775)  7.8 K/ L  3.5-10.5  

 

 RED BLOOD CELL COUNT (BEAKER) (test code=761)  4.21 M/ L  4.63-6.08  

 

 HEMOGLOBIN (BEAKER) (test code=410)  12.5 GM/DL  13.7-17.5  

 

 HEMATOCRIT (BEAKER) (test code=411)  37.7 %  40.1-51.0  

 

 MEAN CORPUSCULAR VOLUME (BEAKER) (test code=753)  89.5 fL  79.0-92.2  

 

 MEAN CORPUSCULAR HEMOGLOBIN (BEAKER) (test  29.7 pg  25.7-32.2  



 caka=758)      

 

 MEAN CORPUSCULAR HEMOGLOBIN CONC (BEAKER) (test  33.2 GM/DL  32.3-36.5  



 code=752)      

 

 RED CELL DISTRIBUTION WIDTH (BEAKER) (test  14.8 %  11.6-14.4  



 code=412)      

 

 PLATELET COUNT (BEAKER) (test code=756)  210 K/CU MM  150-450  

 

 MEAN PLATELET VOLUME (BEAKER) (test code=754)  10.3 fL  9.4-12.4  

 

 NUCLEATED RED BLOOD CELLS (BEAKER) (test  0 /100 WBC  0-0  



 code=413)      

 

 NEUTROPHILS RELATIVE PERCENT (BEAKER) (test  69 %    



 code=429)      

 

 LYMPHOCYTES RELATIVE PERCENT (BEAKER) (test  16 %    



 code=430)      

 

 MONOCYTES RELATIVE PERCENT (BEAKER) (test  9 %    



 code=431)      

 

 EOSINOPHILS RELATIVE PERCENT (BEAKER) (test  1 %    



 code=432)      

 

 BASOPHILS RELATIVE PERCENT (BEAKER) (test  1 %    



 code=437)      

 

 NEUTROPHILS ABSOLUTE COUNT (BEAKER) (test  5.38 K/ L  1.78-5.38  



 code=670)      

 

 LYMPHOCYTES ABSOLUTE COUNT (BEAKER) (test  1.25 K/ L  1.32-3.57  



 code=414)      

 

 MONOCYTES ABSOLUTE COUNT (BEAKER) (test  0.66 K/ L  0.30-0.82  



 code=415)      

 

 EOSINOPHILS ABSOLUTE COUNT (BEAKER) (test  0.06 K/ L  0.04-0.54  



 code=416)      

 

 BASOPHILS ABSOLUTE COUNT (BEAKER) (test  0.09 K/ L  0.01-0.08  



 code=417)      

 

 IMMATURE GRANULOCYTES-RELATIVE PERCENT (BEAKER)  4 %  0-1  



 (test code=2801)      



BLOOD [id number] 05:01:00





 Test Item  Value  Reference Range  Comments

 

 CULTURE (BEAKER) (test code=1095)  No growth in 5 days    



BLOOD [id number] 05:01:00





 Test Item  Value  Reference Range  Comments

 

 CULTURE (BEAKER) (test code=1095)  No growth in 5 days    



POCT-GLUCOSE [id number] 20:57:00





 Test Item  Value  Reference Range  Comments

 

 POC-GLUCOSE METER (BEAKER)  262 mg/dL    TESTED AT [address]



 (test bwiq=1070)      [place] [address]



POCT-GLUCOSE [id number] 18:50:00





 Test Item  Value  Reference Range  Comments

 

 POC-GLUCOSE METER (BEAKER)  102 mg/dL    TESTED AT [address]



 (test zhct=4579)      [place] [address]



POCT-GLUCOSE [id number] 12:47:00





 Test Item  Value  Reference Range  Comments

 

 POC-GLUCOSE METER (BEAKER)  190 mg/dL    TESTED AT [address]



 (test aokl=4547)      [place] [address]



[id number] 05:24:00





 Test Item  Value  Reference Range  Comments

 

 MAGNESIUM (BEAKER) (test code=627)  2.1 mg/dL  1.6-2.6  



BASIC METABOLIC [id number] 05:24:00





 Test Item  Value  Reference Range  Comments

 

 SODIUM (BEAKER) (test  134 meq/L  136-145  



 fomq=882)      

 

 POTASSIUM (BEAKER) (test  3.7 meq/L  3.5-5.1  



 code=379)      

 

 CHLORIDE (BEAKER) (test  105 meq/L    



 code=382)      

 

 CO2 (BEAKER) (test  24 meq/L  22-29  



 code=355)      

 

 BLOOD UREA NITROGEN  7 mg/dL  7-21  



 (BEAKER) (test code=354)      

 

 CREATININE (BEAKER) (test  0.52 mg/dL  0.57-1.25  



 code=358)      

 

 GLUCOSE RANDOM (BEAKER)  106 mg/dL    



 (test code=652)      

 

 CALCIUM (BEAKER) (test  8.0 mg/dL  8.4-10.2  



 code=697)      

 

 EGFR (BEAKER) (test  165 mL/min/1.73 sq m    ESTIMATED GFR IS NOT AS



 code=1092)      ACCURATE AS CREATININE



       CLEARANCE IN PREDICTING



       GLOMERULAR FILTRATION



       RATE. ESTIMATED GFR IS



       NOT APPLICABLE FOR



       DIALYSIS PATIENTS.



CBC W/PLT COUNT &amp; AUTO [id number] 04:52:00





 Test Item  Value  Reference Range  Comments

 

 WHITE BLOOD CELL COUNT (BEAKER) (test code=775)  7.7 K/ L  3.5-10.5  

 

 RED BLOOD CELL COUNT (BEAKER) (test code=761)  4.14 M/ L  4.63-6.08  

 

 HEMOGLOBIN (BEAKER) (test code=410)  12.4 GM/DL  13.7-17.5  

 

 HEMATOCRIT (BEAKER) (test code=411)  37.4 %  40.1-51.0  

 

 MEAN CORPUSCULAR VOLUME (BEAKER) (test code=753)  90.3 fL  79.0-92.2  

 

 MEAN CORPUSCULAR HEMOGLOBIN (BEAKER) (test  30.0 pg  25.7-32.2  



 tggm=582)      

 

 MEAN CORPUSCULAR HEMOGLOBIN CONC (BEAKER) (test  33.2 GM/DL  32.3-36.5  



 code=752)      

 

 RED CELL DISTRIBUTION WIDTH (BEAKER) (test  14.9 %  11.6-14.4  



 code=412)      

 

 PLATELET COUNT (BEAKER) (test code=756)  202 K/CU MM  150-450  

 

 MEAN PLATELET VOLUME (BEAKER) (test code=754)  9.9 fL  9.4-12.4  

 

 NUCLEATED RED BLOOD CELLS (BEAKER) (test  0 /100 WBC  0-0  



 code=413)      

 

 NEUTROPHILS RELATIVE PERCENT (BEAKER) (test  72 %    



 code=429)      

 

 LYMPHOCYTES RELATIVE PERCENT (BEAKER) (test  13 %    



 code=430)      

 

 MONOCYTES RELATIVE PERCENT (BEAKER) (test  7 %    



 code=431)      

 

 EOSINOPHILS RELATIVE PERCENT (BEAKER) (test  1 %    



 code=432)      

 

 BASOPHILS RELATIVE PERCENT (BEAKER) (test  1 %    



 code=437)      

 

 NEUTROPHILS ABSOLUTE COUNT (BEAKER) (test  5.58 K/ L  1.78-5.38  



 code=670)      

 

 LYMPHOCYTES ABSOLUTE COUNT (BEAKER) (test  1.03 K/ L  1.32-3.57  



 code=414)      

 

 MONOCYTES ABSOLUTE COUNT (BEAKER) (test  0.54 K/ L  0.30-0.82  



 code=415)      

 

 EOSINOPHILS ABSOLUTE COUNT (BEAKER) (test  0.11 K/ L  0.04-0.54  



 code=416)      

 

 BASOPHILS ABSOLUTE COUNT (BEAKER) (test  0.06 K/ L  0.01-0.08  



 code=417)      

 

 IMMATURE GRANULOCYTES-RELATIVE PERCENT (BEAKER)  5 %  0-1  



 (test code=2801)      



POCT-GLUCOSE [id number] 22:11:00





 Test Item  Value  Reference Range  Comments

 

 POC-GLUCOSE METER (BEAKER)  131 mg/dL    TESTED AT [place] [id number] [facility]



 (test pmpx=5790)      [place] [address]



POCT-GLUCOSE [id number] 17:52:00





 Test Item  Value  Reference Range  Comments

 

 POC-GLUCOSE METER (BEAKER)  113 mg/dL    TESTED AT [address]



 (test rhyc=2889)      [facility] [address]



MR, CARDIAC, [id number] 16:35:00Reason for exam:-&gt;viabilityFINAL 
REPORT PATIENT ID:   [id number] Cardiac MRI dated 2017 INDICATION: 
This is a 57 year-old male with known ischemic cardiomyopathy presents for 
assessment. Recent angiography demonstrate no coronary artery disease. This 
study is performed in order to quantitate left ventricular function,and to 
determine myocardial viability and damage. TECHNIQUE: [employer]  MRI 
scanner. Morphologic and dynamic cine imaging were performed in multiple 
projections before and after contrast administration.  Thereafter, gadolinium 
was administered, which was followed by viability/scar imaging.  Finally, flow 
quantification sequences were performed to determine the degree of valvular 
dysfunction.   Please refer to the contrast sheet scanned in the EPIC system 
for the amount and route of contrast given. Scanning blood pressure was 90/66. 
Patient weighs 155 pounds, with height of 67 inches. Body surface area is 
approximately 1.82 sq m. FINDINGS: The chest wall and mediastinum appears 
unremarkable.The pericardium and pulmonary arteries appear normal; no 
pericardial effusion is identified in the central pulmonary artery is normal in 
calibre. Limited imaging through the lungs reveals no gross abnormalities; some 
dependent changes are seen in the lung bases.  The cardiac chambers demonstrate 
normal atrioventricular and ventriculoarterial concordance, and systemic and 
pulmonary venous return.   The thoracic aorta is normal in course, calibre, and 
contour. There is no evidence of acute aortic pathology, such as dissection, 
intramural hematoma, or contained rupture.  The left ventricle is normal and 
enlarged with severely global hypokinesis/akinesis.  Quantitative values are as 
follows: DCB=874 cc; RZJ=748 cc; stroke volume=63 cc; and ejection fraction=21%
.  Calculated absolute cardiac output=6.2 liters/min.  Absolute left 
ventricular necy=992 grams. Index UWPCA=650 cc/sq m confirming left ventricular 
enlargement. No evidence of hypertrophic cardiomyopathy or left ventricular 
noncompaction noted. The right ventricle is normal in size with mild systolic 
dysfunction. Quantitative values are as follows: MTR=490 cc; ESV=97 cc; and 
ejection fraction=34%.   Cine imaging and flow quantification unremarkable 
mitral and aortic valve function. Trace tricuspid regurgitation is present. 
Viability/scar imaging reveals no evidence of prior myocardial infarction. 
However, there is some subtle linear mid myocardial hyperenhancement identified 
in the basal septum, consistent with interstitial fibrosis, a nonspecific 
finding, commonly seen in patient with dilated nonischemic cardiomyopathy. 
Furthermore, this is consistent with raised native myocardial T1 in the 
experimental T1 mapping sequence. There is substantial reduction in left 
ventricular long axis strain, -4.3% (normal MRI reference value for male is 
16.5 + / - 2.2%). Ventricular thrombus is not present. Left atrial enlargement 
is identified. CONCLUSIONS: 1. The left ventricle is enlarged with severe 
global systolic dysfunction. Ejection fraction is quantified to be 21%.   
Quantitative left ventricular functional values are as described above.Viability
/scar imaging is normal.  There is no evidence of prior myocardial damage. No 
imaging evidence to suggest left ventricular noncompaction. There is mid 
myocardial hyperenhancement identified best seen in the basal septum indicating 
interstitial fibrosis, nonspecific finding in patient with cardiomyopathy. 
Substantial reduction in left ventricular long axis strain. 2.  The right 
ventricle is normal in size with overall mild systolic dysfunction.  
Quantitative right ventricular functional veins as described above. 3.  Normal 
aortic and mitral valvular function. 4.  Left atrial enlargement. Signed: Angel Fink MDReport Verified Date/Time:  2018 16:35:20 Reading Location: 
[place] [address] Cardiology MRI    Electronically signed by: ANGEL FINK M.D. 
on 2018 04:35 PMPOCT-GLUCOSE [id number] 13:41:00





 Test Item  Value  Reference Range  Comments

 

 POC-GLUCOSE METER (BEAKER)  131 mg/dL    TESTED AT [facility] [address] [facility]



 (test jzfv=3974)      [facility] [address]



POCT-GLUCOSE [id number] 08:39:00





 Test Item  Value  Reference Range  Comments

 

 POC-GLUCOSE METER (BEAKER)  127 mg/dL    TESTED AT [facility] [address] [facility]



 (test kitv=9748)      [facility] [address]



[id number] 06:51:00





 Test Item  Value  Reference Range  Comments

 

 MAGNESIUM (BEAKER) (test code=627)  1.8 mg/dL  1.6-2.6  



BASIC METABOLIC [id number] 06:51:00





 Test Item  Value  Reference Range  Comments

 

 SODIUM (BEAKER) (test  136 meq/L  136-145  



 kflj=892)      

 

 POTASSIUM (BEAKER) (test  4.2 meq/L  3.5-5.1  



 code=379)      

 

 CHLORIDE (BEAKER) (test  107 meq/L    



 code=382)      

 

 CO2 (BEAKER) (test  24 meq/L  22-29  



 code=355)      

 

 BLOOD UREA NITROGEN  6 mg/dL  7-21  



 (BEAKER) (test code=354)      

 

 CREATININE (BEAKER) (test  0.53 mg/dL  0.57-1.25  



 code=358)      

 

 GLUCOSE RANDOM (BEAKER)  111 mg/dL    



 (test code=652)      

 

 CALCIUM (BEAKER) (test  8.1 mg/dL  8.4-10.2  



 code=697)      

 

 EGFR (BEAKER) (test  161 mL/min/1.73 sq m    ESTIMATED GFR IS NOT AS



 code=1092)      ACCURATE AS CREATININE



       CLEARANCE IN PREDICTING



       GLOMERULAR FILTRATION



       RATE. ESTIMATED GFR IS



       NOT APPLICABLE FOR



       DIALYSIS PATIENTS.



CBC W/PLT COUNT &amp; AUTO [id number] 05:57:00





 Test Item  Value  Reference Range  Comments

 

 WHITE BLOOD CELL COUNT (BEAKER) (test code=775)  8.2 K/ L  3.5-10.5  

 

 RED BLOOD CELL COUNT (BEAKER) (test code=761)  4.27 M/ L  4.63-6.08  

 

 HEMOGLOBIN (BEAKER) (test code=410)  12.7 GM/DL  13.7-17.5  

 

 HEMATOCRIT (BEAKER) (test code=411)  38.7 %  40.1-51.0  

 

 MEAN CORPUSCULAR VOLUME (BEAKER) (test code=753)  90.6 fL  79.0-92.2  

 

 MEAN CORPUSCULAR HEMOGLOBIN (BEAKER) (test  29.7 pg  25.7-32.2  



 qjdw=242)      

 

 MEAN CORPUSCULAR HEMOGLOBIN CONC (BEAKER) (test  32.8 GM/DL  32.3-36.5  



 code=752)      

 

 RED CELL DISTRIBUTION WIDTH (BEAKER) (test  14.9 %  11.6-14.4  



 code=412)      

 

 PLATELET COUNT (BEAKER) (test code=756)  207 K/CU MM  150-450  

 

 MEAN PLATELET VOLUME (BEAKER) (test code=754)  10.2 fL  9.4-12.4  

 

 NUCLEATED RED BLOOD CELLS (BEAKER) (test  0 /100 WBC  0-0  



 code=413)      

 

 NEUTROPHILS RELATIVE PERCENT (BEAKER) (test  72 %    



 code=429)      

 

 LYMPHOCYTES RELATIVE PERCENT (BEAKER) (test  14 %    



 code=430)      

 

 MONOCYTES RELATIVE PERCENT (BEAKER) (test  7 %    



 code=431)      

 

 EOSINOPHILS RELATIVE PERCENT (BEAKER) (test  2 %    



 code=432)      

 

 BASOPHILS RELATIVE PERCENT (BEAKER) (test  1 %    



 code=437)      

 

 NEUTROPHILS ABSOLUTE COUNT (BEAKER) (test  5.92 K/ L  1.78-5.38  



 code=670)      

 

 LYMPHOCYTES ABSOLUTE COUNT (BEAKER) (test  1.11 K/ L  1.32-3.57  



 code=414)      

 

 MONOCYTES ABSOLUTE COUNT (BEAKER) (test  0.55 K/ L  0.30-0.82  



 code=415)      

 

 EOSINOPHILS ABSOLUTE COUNT (BEAKER) (test  0.13 K/ L  0.04-0.54  



 code=416)      

 

 BASOPHILS ABSOLUTE COUNT (BEAKER) (test  0.09 K/ L  0.01-0.08  



 code=417)      

 

 IMMATURE GRANULOCYTES-RELATIVE PERCENT (BEAKER)  5 %  0-1  



 (test code=2801)      



POCT-GLUCOSE [id number] 21:08:00





 Test Item  Value  Reference Range  Comments

 

 POC-GLUCOSE METER (BEAKER)  195 mg/dL    TESTED AT [address]



 (test gdid=6388)      [facility] [address]



POCT-GLUCOSE [id number] 18:00:00





 Test Item  Value  Reference Range  Comments

 

 POC-GLUCOSE METER (BEAKER)  175 mg/dL    TESTED AT [address]



 (test tgxz=2207)      [facility] [address]



POCT-GLUCOSE [id number] 12:45:00





 Test Item  Value  Reference Range  Comments

 

 POC-GLUCOSE METER (BEAKER)  183 mg/dL    TESTED AT [address]



 (test czsc=0113)      [facility] [address]



POCT-GLUCOSE [id number] 08:34:00





 Test Item  Value  Reference Range  Comments

 

 POC-GLUCOSE METER (BEAKER)  127 mg/dL    TESTED AT [address]



 (test lrzq=5448)      [facility] [address]



PROTEIN, 24 HOUR [id number] 08:28:00





 Test Item  Value  Reference Range  Comments

 

 PROTEIN, 24HR URINE (BEAKER) (test code=1570)  < mg/24hr  0-300  

 

 VOLUME, TOTAL (BEAKER) (test code=1457)  1400 ml    

 

 PROTEIN, URINE (BEAKER) (test code=1569)  < mg/dL  0-14  



MICROALBUMIN, 24 HOUR [id number] 08:24:00





 Test Item  Value  Reference Range  Comments

 

 VOLUME, TOTAL (BEAKER) (test code=1457)  1400 ml    

 

 MICROALBUMIN URINE (BEAKER) (test code=1794)  < mg/dL    

 

 MICROALBUMIN 24 HOUR URINE (BEAKER) (test  < mg/24 hrs  0-30  



 code=623)      



URINE [id number] 07:44:00





 Test Item  Value  Reference Range  Comments

 

 CULTURE (BEAKER) (test      10-19,000 col/mL Candida



 code=1095)      albicans



[id number] 05:03:00





 Test Item  Value  Reference Range  Comments

 

 MAGNESIUM (BEAKER) (test  1.8 mg/dL  1.6-2.6  Specimen slightly hemolyzed



 code=627)      



BASIC METABOLIC [id number] 05:03:00





 Test Item  Value  Reference Range  Comments

 

 SODIUM (BEAKER) (test  134 meq/L  136-145  



 wvqe=478)      

 

 POTASSIUM (BEAKER) (test  4.3 meq/L  3.5-5.1  Specimen slightly



 code=379)      hemolyzed

 

 CHLORIDE (BEAKER) (test  104 meq/L    



 code=382)      

 

 CO2 (BEAKER) (test  24 meq/L  22-29  



 code=355)      

 

 BLOOD UREA NITROGEN  9 mg/dL  7-21  



 (BEAKER) (test code=354)      

 

 CREATININE (BEAKER) (test  0.55 mg/dL  0.57-1.25  Specimen slightly



 code=358)      hemolyzed

 

 GLUCOSE RANDOM (BEAKER)  104 mg/dL    



 (test code=652)      

 

 CALCIUM (BEAKER) (test  8.1 mg/dL  8.4-10.2  



 code=697)      

 

 EGFR (BEAKER) (test  155 mL/min/1.73 sq m    ESTIMATED GFR IS NOT AS



 code=1092)      ACCURATE AS CREATININE



       CLEARANCE IN PREDICTING



       GLOMERULAR FILTRATION



       RATE. ESTIMATED GFR IS



       NOT APPLICABLE FOR



       DIALYSIS PATIENTS.



CBC W/PLT COUNT &amp; AUTO [id number] 04:09:00





 Test Item  Value  Reference Range  Comments

 

 WHITE BLOOD CELL COUNT (BEAKER) (test code=775)  8.9 K/ L  3.5-10.5  

 

 RED BLOOD CELL COUNT (BEAKER) (test code=761)  4.56 M/ L  4.63-6.08  

 

 HEMOGLOBIN (BEAKER) (test code=410)  13.5 GM/DL  13.7-17.5  

 

 HEMATOCRIT (BEAKER) (test code=411)  41.6 %  40.1-51.0  

 

 MEAN CORPUSCULAR VOLUME (BEAKER) (test code=753)  91.2 fL  79.0-92.2  

 

 MEAN CORPUSCULAR HEMOGLOBIN (BEAKER) (test  29.6 pg  25.7-32.2  



 fcwj=259)      

 

 MEAN CORPUSCULAR HEMOGLOBIN CONC (BEAKER) (test  32.5 GM/DL  32.3-36.5  



 code=752)      

 

 RED CELL DISTRIBUTION WIDTH (BEAKER) (test  15.0 %  11.6-14.4  



 code=412)      

 

 PLATELET COUNT (BEAKER) (test code=756)  217 K/CU MM  150-450  

 

 MEAN PLATELET VOLUME (BEAKER) (test code=754)  9.9 fL  9.4-12.4  

 

 NUCLEATED RED BLOOD CELLS (BEAKER) (test  0 /100 WBC  0-0  



 code=413)      

 

 NEUTROPHILS RELATIVE PERCENT (BEAKER) (test  74 %    



 code=429)      

 

 LYMPHOCYTES RELATIVE PERCENT (BEAKER) (test  13 %    



 code=430)      

 

 MONOCYTES RELATIVE PERCENT (BEAKER) (test  5 %    



 code=431)      

 

 EOSINOPHILS RELATIVE PERCENT (BEAKER) (test  1 %    



 code=432)      

 

 BASOPHILS RELATIVE PERCENT (BEAKER) (test  1 %    



 code=437)      

 

 NEUTROPHILS ABSOLUTE COUNT (BEAKER) (test  6.60 K/ L  1.78-5.38  



 code=670)      

 

 LYMPHOCYTES ABSOLUTE COUNT (BEAKER) (test  1.16 K/ L  1.32-3.57  



 code=414)      

 

 MONOCYTES ABSOLUTE COUNT (BEAKER) (test  0.48 K/ L  0.30-0.82  



 code=415)      

 

 EOSINOPHILS ABSOLUTE COUNT (BEAKER) (test  0.10 K/ L  0.04-0.54  



 code=416)      

 

 BASOPHILS ABSOLUTE COUNT (BEAKER) (test  0.07 K/ L  0.01-0.08  



 code=417)      

 

 IMMATURE GRANULOCYTES-RELATIVE PERCENT (BEAKER)  5 %  0-1  



 (test code=2801)      



POCT-GLUCOSE [id number] 21:03:00





 Test Item  Value  Reference Range  Comments

 

 POC-GLUCOSE METER (BEAKER)  198 mg/dL    TESTED AT [address]



 (test deus=8837)      [place] [address]



POCT-GLUCOSE [id number] 16:56:00





 Test Item  Value  Reference Range  Comments

 

 POC-GLUCOSE METER (BEAKER)  126 mg/dL    TESTED AT [address]



 (test wbml=3714)      [place] [id number]



CBC W/PLT COUNT &amp; AUTO [id number] 13:38:00





 Test Item  Value  Reference Range  Comments

 

 WHITE BLOOD CELL COUNT (BEAKER) (test code=775)  9.1 K/ L  3.5-10.5  

 

 RED BLOOD CELL COUNT (BEAKER) (test code=761)  4.96 M/ L  4.63-6.08  

 

 HEMOGLOBIN (BEAKER) (test code=410)  14.7 GM/DL  13.7-17.5  

 

 HEMATOCRIT (BEAKER) (test code=411)  45.3 %  40.1-51.0  

 

 MEAN CORPUSCULAR VOLUME (BEAKER) (test code=753)  91.3 fL  79.0-92.2  

 

 MEAN CORPUSCULAR HEMOGLOBIN (BEAKER) (test  29.6 pg  25.7-32.2  



 drqp=412)      

 

 MEAN CORPUSCULAR HEMOGLOBIN CONC (BEAKER) (test  32.5 GM/DL  32.3-36.5  



 code=752)      

 

 RED CELL DISTRIBUTION WIDTH (BEAKER) (test  15.1 %  11.6-14.4  



 code=412)      

 

 PLATELET COUNT (BEAKER) (test code=756)  237 K/CU MM  150-450  

 

 MEAN PLATELET VOLUME (BEAKER) (test code=754)  9.7 fL  9.4-12.4  

 

 NUCLEATED RED BLOOD CELLS (BEAKER) (test  0 /100 WBC  0-0  



 code=413)      

 

 NEUTROPHILS RELATIVE PERCENT (BEAKER) (test  72 %    



 code=429)      

 

 LYMPHOCYTES RELATIVE PERCENT (BEAKER) (test  15 %    



 code=430)      

 

 MONOCYTES RELATIVE PERCENT (BEAKER) (test  5 %    



 code=431)      

 

 EOSINOPHILS RELATIVE PERCENT (BEAKER) (test  1 %    



 code=432)      

 

 BASOPHILS RELATIVE PERCENT (BEAKER) (test  1 %    



 code=437)      

 

 NEUTROPHILS ABSOLUTE COUNT (BEAKER) (test  6.54 K/ L  1.78-5.38  



 code=670)      

 

 LYMPHOCYTES ABSOLUTE COUNT (BEAKER) (test  1.34 K/ L  1.32-3.57  



 code=414)      

 

 MONOCYTES ABSOLUTE COUNT (BEAKER) (test  0.46 K/ L  0.30-0.82  



 code=415)      

 

 EOSINOPHILS ABSOLUTE COUNT (BEAKER) (test  0.11 K/ L  0.04-0.54  



 code=416)      

 

 BASOPHILS ABSOLUTE COUNT (BEAKER) (test  0.08 K/ L  0.01-0.08  



 code=417)      

 

 IMMATURE GRANULOCYTES-RELATIVE PERCENT (BEAKER)  6 %  0-1  



 (test code=2801)      



(MANUAL DIFFERENTIAL)2018 13:38:00





 Test Item  Value  Reference Range  Comments

 

 TOTAL COUNTED (BEAKER) (test code=1351)      

 

 WBC MORPHOLOGY (BEAKER) (test code=487)  Normal    

 

 PLT MORPHOLOGY (BEAKER) (test code=486)  Normal    

 

 RBC MORPHOLOGY (BEAKER) (test code=762)  Normal    



LIPID [id number] 13:17:00





 Test Item  Value  Reference Range  Comments

 

 TRIGLYCERIDES (BEAKER) (test code=540)  222 mg/dL    

 

 CHOLESTEROL (BEAKER) (test code=631)  214 mg/dL    

 

 HDL CHOLESTEROL (BEAKER) (test code=976)  29 mg/dL    

 

 LDL CHOLESTEROL CALCULATED (BEAKER) (test  141 mg/dL    



 code=633)      



Triglyceride Reference Range:   Low Risk         &lt;150   Borderline    150-
199   High Risk     200-499   Very High Risk  &gt;=500Cholesterol Reference 
Range:   Low Risk         &lt;200   Borderline 200-239    High Risk        &gt;
240HDL Cholesterol Reference Range:   Low Risk         &gt;=60   High Risk     
    &lt;40LDL Cholesterol Reference Range:   Optimal          &lt;100   Near 
Optimal  100-129   Borderline    130-159   High          160-189   Very High   
    &gt;=190POCT-GLUCOSE [id number] 08:05:00





 Test Item  Value  Reference Range  Comments

 

 POC-GLUCOSE METER (BEAKER)  129 mg/dL    TESTED AT [facility] [address] [facility]



 (test ndrb=7841)      [facility] [address]



[id number] 04:40:00





 Test Item  Value  Reference Range  Comments

 

 MAGNESIUM (BEAKER) (test code=627)  2.0 mg/dL  1.6-2.6  



BASIC METABOLIC [id number] 04:40:00





 Test Item  Value  Reference Range  Comments

 

 SODIUM (BEAKER) (test  137 meq/L  136-145  



 vmks=734)      

 

 POTASSIUM (BEAKER) (test  4.5 meq/L  3.5-5.1  



 code=379)      

 

 CHLORIDE (BEAKER) (test  101 meq/L    



 code=382)      

 

 CO2 (BEAKER) (test  30 meq/L  22-29  



 code=355)      

 

 BLOOD UREA NITROGEN  13 mg/dL  7-21  



 (BEAKER) (test code=354)      

 

 CREATININE (BEAKER) (test  0.66 mg/dL  0.57-1.25  



 code=358)      

 

 GLUCOSE RANDOM (BEAKER)  110 mg/dL    



 (test code=652)      

 

 CALCIUM (BEAKER) (test  8.8 mg/dL  8.4-10.2  



 code=697)      

 

 EGFR (BEAKER) (test  125 mL/min/1.73 sq m    ESTIMATED GFR IS NOT AS



 code=1092)      ACCURATE AS CREATININE



       CLEARANCE IN PREDICTING



       GLOMERULAR FILTRATION



       RATE. ESTIMATED GFR IS



       NOT APPLICABLE FOR



       DIALYSIS PATIENTS.



[id number] 04:27:00





 Test Item  Value  Reference Range  Comments

 

 PROTIME (BEAKER) (test code=759)  13.1 seconds  11.7-14.7  

 

 INR (BEAKER) (test code=370)  1.0  <=5.9  

 

 PARTIAL THROMBOPLASTIN TIME (BEAKER) (test  28.6 seconds  22.5-36.0  



 code=760)      



RECOMMENDED COUMADIN/WARFARIN INR THERAPY RANGESSTANDARD DOSE: 2.0 - 3.0   
Includes: PROPHYLAXIS forvenous thrombosis, systemic embolization; TREATMENT 
for venous thrombosis and/or pulmonary embolus.HIGH RISK: Target INR is 2.5-3.5 
for patients with mechanical heart valves.POCT-GLUCOSE [id number] 21:33:00





 Test Item  Value  Reference Range  Comments

 

 POC-GLUCOSE METER (BEAKER)  223 mg/dL    TESTED AT [address]



 (test okdz=7287)      [place] [id number]



POCT-GLUCOSE [id number] 18:07:00





 Test Item  Value  Reference Range  Comments

 

 POC-GLUCOSE METER (BEAKER)  114 mg/dL    TESTED AT [address]



 (test regy=6876)      [place] [address]



(MANUAL DIFFERENTIAL)2018 15:42:00





 Test Item  Value  Reference Range  Comments

 

 NEUTROPHILS - REL (DIFF) (BEAKER) (test  74 %    



 code=1359)      

 

 LYMPHOCYTES - REL (DIFF) (BEAKER) (test  11 %    



 code=1360)      

 

 MONOCYTES - REL (DIFF) (BEAKER) (test code=1361)  8 %    

 

 METAMYELOCYTES-REL (DIFF) (BEAKER) (test  1 %  0-0  



 code=258)      

 

 MYELOCYTES-REL (DIFF) (BEAKER) (test code=1594)  1 %  0-0  

 

 BANDS - REL (DIFF) (BEAKER) (test code=1348)  5 %  0-10  

 

 NEUTROPHILS - ABS (DIFF) (BEAKER) (test  6.81 K/ L  1.80-8.00  



 code=1365)      

 

 LYMPHOCYTES - ABS (DIFF) (BEAKER) (test  1.01 K/ L  1.48-4.50  



 code=1366)      

 

 MONOCYTES - ABS (DIFF) (BEAKER) (test code=1367)  0.74 K/ L  0.00-1.30  

 

 METAMYELOCTYES - ABS (DIFF) (BEAKER) (test  0.09 K/ L  0.00-0.00  



 code=261)      

 

 BANDS-ABS (DIFF) (BEAKER) (test code=1349)  0.5 K/ L  0.0-0.8  

 

 MYELOCYTES-ABS (DIFF) (BEAKER) (test code=1593)  0.09 K/ L  0.00-0.00  

 

 TOTAL COUNTED (BEAKER) (test code=1351)  100    

 

 BANDS + SEGMENTED NEUTROPHILS (BEAKER) (test  7.27    



 code=1352)      

 

 WBC MORPHOLOGY (BEAKER) (test code=487)  Normal    

 

 PLT MORPHOLOGY (BEAKER) (test code=486)  Normal    

 

 POIKILOCYTES (BEAKER) (test code=966)  2+ moderate    

 

 POLYCHROMATOPHILLIC RBCS(BEAKER) (test code=478)  1+ few    



CBC W/PLT COUNT &amp; AUTO [id number] 15:41:00





 Test Item  Value  Reference Range  Comments

 

 WHITE BLOOD CELL COUNT (BEAKER) (test code=775)  9.2 K/ L  3.5-10.5  

 

 RED BLOOD CELL COUNT (BEAKER) (test code=761)  4.81 M/ L  4.63-6.08  

 

 HEMOGLOBIN (BEAKER) (test code=410)  14.1 GM/DL  13.7-17.5  

 

 HEMATOCRIT (BEAKER) (test code=411)  44.0 %  40.1-51.0  

 

 MEAN CORPUSCULAR VOLUME (BEAKER) (test code=753)  91.5 fL  79.0-92.2  

 

 MEAN CORPUSCULAR HEMOGLOBIN (BEAKER) (test  29.3 pg  25.7-32.2  



 qkvu=547)      

 

 MEAN CORPUSCULAR HEMOGLOBIN CONC (BEAKER) (test  32.0 GM/DL  32.3-36.5  



 code=752)      

 

 RED CELL DISTRIBUTION WIDTH (BEAKER) (test  15.2 %  11.6-14.4  



 code=412)      

 

 PLATELET COUNT (BEAKER) (test code=756)  196 K/CU MM  150-450  

 

 MEAN PLATELET VOLUME (BEAKER) (test code=754)  10.4 fL  9.4-12.4  

 

 NUCLEATED RED BLOOD CELLS (BEAKER) (test  0 /100 WBC  0-0  



 code=413)      

 

 NEUTROPHILS RELATIVE PERCENT (BEAKER) (test  76 %    



 code=429)      

 

 LYMPHOCYTES RELATIVE PERCENT (BEAKER) (test  12 %    



 code=430)      

 

 MONOCYTES RELATIVE PERCENT (BEAKER) (test  6 %    



 code=431)      

 

 EOSINOPHILS RELATIVE PERCENT (BEAKER) (test  1 %    



 code=432)      

 

 BASOPHILS RELATIVE PERCENT (BEAKER) (test  0 %    



 code=437)      

 

 NEUTROPHILS ABSOLUTE COUNT (BEAKER) (test  6.95 K/ L  1.78-5.38  



 code=670)      

 

 LYMPHOCYTES ABSOLUTE COUNT (BEAKER) (test  1.07 K/ L  1.32-3.57  



 code=414)      

 

 MONOCYTES ABSOLUTE COUNT (BEAKER) (test  0.51 K/ L  0.30-0.82  



 code=415)      

 

 EOSINOPHILS ABSOLUTE COUNT (BEAKER) (test  0.10 K/ L  0.04-0.54  



 code=416)      

 

 BASOPHILS ABSOLUTE COUNT (BEAKER) (test  0.01 K/ L  0.01-0.08  



 code=417)      

 

 IMMATURE GRANULOCYTES-RELATIVE PERCENT (BEAKER)  6 %  0-1  



 (test code=2801)      



POCT-GLUCOSE [id number] 11:48:00





 Test Item  Value  Reference Range  Comments

 

 POC-GLUCOSE METER (BEAKER)  272 mg/dL    TESTED AT [address]



 (test bwts=9080)      [place] [id number]



POCT-GLUCOSE [id number] 07:34:00





 Test Item  Value  Reference Range  Comments

 

 POC-GLUCOSE METER (BEAKER)  125 mg/dL    TESTED AT [address]



 (test ygij=5529)      [place] [address]



[id number] 05:43:00





 Test Item  Value  Reference Range  Comments

 

 MAGNESIUM (BEAKER) (test code=627)  2.0 mg/dL  1.6-2.6  



BASIC METABOLIC [id number] 05:43:00





 Test Item  Value  Reference Range  Comments

 

 SODIUM (BEAKER) (test  139 meq/L  136-145  



 smqv=009)      

 

 POTASSIUM (BEAKER) (test  3.7 meq/L  3.5-5.1  



 code=379)      

 

 CHLORIDE (BEAKER) (test  102 meq/L    



 code=382)      

 

 CO2 (BEAKER) (test  28 meq/L  22-29  



 code=355)      

 

 BLOOD UREA NITROGEN  16 mg/dL  7-21  



 (BEAKER) (test code=354)      

 

 CREATININE (BEAKER) (test  0.58 mg/dL  0.57-1.25  



 code=358)      

 

 GLUCOSE RANDOM (BEAKER)  130 mg/dL    



 (test code=652)      

 

 CALCIUM (BEAKER) (test  8.8 mg/dL  8.4-10.2  



 code=697)      

 

 EGFR (BEAKER) (test  145 mL/min/1.73 sq m    ESTIMATED GFR IS NOT AS



 code=1092)      ACCURATE AS CREATININE



       CLEARANCE IN PREDICTING



       GLOMERULAR FILTRATION



       RATE. ESTIMATED GFR IS



       NOT APPLICABLE FOR



       DIALYSIS PATIENTS.



URINALYSIS W/ [id number] 01:48:00





 Test Item  Value  Reference Range  Comments

 

 COLOR (BEAKER) (test code=470)  Yellow    

 

 CLARITY (BEAKER) (test code=469)  Clear    

 

 SPECIFIC GRAVITY UA (BEAKER) (test code=468)  1.017  1.001-1.035  

 

 PH UA (BEAKER) (test code=467)  7.0  5.0-8.0  

 

 PROTEIN UA (BEAKER) (test code=464)  Negative  Negative  

 

 GLUCOSE UA (BEAKER) (test code=365)  300 mg/dL  Negative  

 

 KETONES UA (BEAKER) (test code=371)  10 mg/dL  Negative  

 

 BILIRUBIN UA (BEAKER) (test code=462)  Negative  Negative  

 

 BLOOD UA (BEAKER) (test code=461)  Negative  Negative  

 

 NITRITE UA (BEAKER) (test code=465)  Negative  Negative  

 

 LEUKOCYTE ESTERASE UA (BEAKER) (test code=466)  Negative  Negative  

 

 UROBILINOGEN UA (BEAKER) (test code=463)  2.0 mg/dL  0.2-1.0  

 

 RBC UA (BEAKER) (test code=519)  < /HPF    

 

 WBC UA (BEAKER) (test code=520)  1 /HPF    

 

 SQUAMOUS EPITHELIAL (BEAKER) (test code=516)  < /HPF    

 

 SOURCE(BEAKER) (test code=2795)  Urine, Voided    



HEMOGLOBIN [id number] 22:45:00





 Test Item  Value  Reference Range  Comments

 

 HEMOGLOBIN A1C (BEAKER) (test code=368)  9.2 %  4.3-6.1  



TROPONIN -94-85 21:44:00





 Test Item  Value  Reference Range  Comments

 

 TROPONIN I (BEAKER) (test code=397)  0.32 ng/mL  0.00-0.03  








 Test Item  Value  Reference Range  Comments

 

 POC-GLUCOSE METER (BEAKER)  286 mg/dL    TESTED AT [facility] [address] [facility]



 (test jytk=2161)      [facility] [address]



TSH/FREE T4 IF [id number] 21:42:00





 Test Item  Value  Reference Range  Comments

 

 THYROID STIMULATING HORMONE (BEAKER) (test  1.03 uIU/mL  0.35-4.94  



 code=772)      



PROTHROMBIN [id number] 21:33:00





 Test Item  Value  Reference Range  Comments

 

 PROTIME (BEAKER) (test code=759)  13.1 seconds  11.7-14.7  

 

 INR (BEAKER) (test code=370)  1.0  <=5.9  



RECOMMENDED COUMADIN/WARFARIN INR THERAPY RANGESSTANDARD DOSE: 2.0 - 3.0   
Includes: PROPHYLAXIS forvenous thrombosis, systemic embolization; TREATMENT 
for venous thrombosis and/or pulmonary embolus.HIGH RISK: Target INR is 2.5-3.5 
for patients with mechanical heart valves.B-TYPE NATRIURETIC FACTOR (BNP)2017 21:28:00





 Test Item  Value  Reference Range  Comments

 

 B-TYPE NATRIURETIC PEPTIDE (BEAKER) (test  825 pg/mL  0-100  



 code=700)      



[id number] 21:24:00





 Test Item  Value  Reference Range  Comments

 

 MAGNESIUM (BEAKER) (test code=627)  2.0 mg/dL  1.6-2.6  



BASIC METABOLIC [id number] 21:24:00





 Test Item  Value  Reference Range  Comments

 

 SODIUM (BEAKER) (test  137 meq/L  136-145  



 kfnt=868)      

 

 POTASSIUM (BEAKER) (test  3.8 meq/L  3.5-5.1  



 code=379)      

 

 CHLORIDE (BEAKER) (test  98 meq/L    



 code=382)      

 

 CO2 (BEAKER) (test  30 meq/L  22-29  



 code=355)      

 

 BLOOD UREA NITROGEN  18 mg/dL  7-21  



 (BEAKER) (test code=354)      

 

 CREATININE (BEAKER) (test  0.74 mg/dL  0.57-1.25  



 code=358)      

 

 GLUCOSE RANDOM (BEAKER)  267 mg/dL    



 (test code=652)      

 

 CALCIUM (BEAKER) (test  8.9 mg/dL  8.4-10.2  



 code=697)      

 

 EGFR (BEAKER) (test  110 mL/min/1.73 sq m    ESTIMATED GFR IS NOT AS



 code=1092)      ACCURATE AS CREATININE



       CLEARANCE IN PREDICTING



       GLOMERULAR FILTRATION



       RATE. ESTIMATED GFR IS



       NOT APPLICABLE FOR



       DIALYSIS PATIENTS.



HEPATIC FUNCTION [id number] 21:24:00





 Test Item  Value  Reference Range  Comments

 

 TOTAL PROTEIN (BEAKER) (test code=770)  5.9 gm/dL  6.0-8.3  

 

 ALBUMIN (BEAKER) (test code=1145)  3.2 g/dL  3.5-5.0  

 

 BILIRUBIN TOTAL (BEAKER) (test code=377)  1.1 mg/dL  0.2-1.2  

 

 BILIRUBIN DIRECT (BEAKER) (test code=706)  0.3 mg/dL  0.1-0.5  

 

 ALKALINE PHOSPHATASE (BEAKER) (test code=346)  105 U/L    

 

 AST (SGOT) (BEAKER) (test code=353)  13 U/L  5-34  

 

 ALT (SGPT) (BEAKER) (test code=347)  37 U/L  6-55  



CBC W/PLT COUNT &amp; AUTO [id number] 21:24:00





 Test Item  Value  Reference Range  Comments

 

 WHITE BLOOD CELL COUNT (BEAKER) (test code=775)  8.9 K/ L  3.5-10.5  

 

 RED BLOOD CELL COUNT (BEAKER) (test code=761)  4.91 M/ L  4.63-6.08  

 

 HEMOGLOBIN (BEAKER) (test code=410)  14.4 GM/DL  13.7-17.5  

 

 HEMATOCRIT (BEAKER) (test code=411)  44.4 %  40.1-51.0  

 

 MEAN CORPUSCULAR VOLUME (BEAKER) (test code=753)  90.4 fL  79.0-92.2  

 

 MEAN CORPUSCULAR HEMOGLOBIN (BEAKER) (test  29.3 pg  25.7-32.2  



 hquu=229)      

 

 MEAN CORPUSCULAR HEMOGLOBIN CONC (BEAKER) (test  32.4 GM/DL  32.3-36.5  



 code=752)      

 

 RED CELL DISTRIBUTION WIDTH (BEAKER) (test  15.0 %  11.6-14.4  



 code=412)      

 

 PLATELET COUNT (BEAKER) (test code=756)  249 K/CU MM  150-450  

 

 MEAN PLATELET VOLUME (BEAKER) (test code=754)  10.0 fL  9.4-12.4  

 

 NUCLEATED RED BLOOD CELLS (BEAKER) (test  0 /100 WBC  0-0  



 code=413)      

 

 NEUTROPHILS RELATIVE PERCENT (BEAKER) (test  82 %    



 code=429)      

 

 LYMPHOCYTES RELATIVE PERCENT (BEAKER) (test  9 %    



 code=430)      

 

 MONOCYTES RELATIVE PERCENT (BEAKER) (test  5 %    



 code=431)      

 

 EOSINOPHILS RELATIVE PERCENT (BEAKER) (test  0 %    



 code=432)      

 

 BASOPHILS RELATIVE PERCENT (BEAKER) (test  0 %    



 code=437)      

 

 NEUTROPHILS ABSOLUTE COUNT (BEAKER) (test  7.24 K/ L  1.78-5.38  



 code=670)      

 

 LYMPHOCYTES ABSOLUTE COUNT (BEAKER) (test  0.80 K/ L  1.32-3.57  



 code=414)      

 

 MONOCYTES ABSOLUTE COUNT (BEAKER) (test  0.40 K/ L  0.30-0.82  



 code=415)      

 

 EOSINOPHILS ABSOLUTE COUNT (BEAKER) (test  0.01 K/ L  0.04-0.54  



 code=416)      

 

 BASOPHILS ABSOLUTE COUNT (BEAKER) (test  0.01 K/ L  0.01-0.08  



 code=417)      

 

 IMMATURE GRANULOCYTES-RELATIVE PERCENT (BEAKER)  5 %  0-1  



 (test code=2801)      



LACTIC ACID, VENOUS, WHOLE [id number] 21:18:00





 Test Item  Value  Reference Range  Comments

 

 LACTATE BLOOD VENOUS (2)  1.2 mmol/L  0.5-2.2  Specimen moderately hemolyzed



 (BEAKER) (test code=2872)      



Effective 2016: Units/Reference Range ChangeNew: 0.5-2.2 mmol/L  Previous: 5
-20 mg/dLRAD, CHEST, 1 VIEW, NON [id number] 21:06:00Reason for exam:-&gt;
shortness of breathShould this be performed at the bedside?-&gt;YesFINAL REPORT 
PATIENT ID:   [id number]  History: Shortness of breath. Comparison: None. Findings
: A single view of the chest is submitted. The cardiac silhouette is prominent 
in size but magnified by low lung volumes and portable technique.  There is 
central pulmonary vascular congestion. Bilateral interstitial and patchy mid 
and lower lung airspace opacities may reflect a combination of atelectasis 
andedema but pneumonitis should be excluded clinically. A small right pleural 
effusion is suspected.   There is no pneumothorax or acute bony abnormality. 
Signed: Emil Farooq MDReport Verified Date/Time:  2017 21:06:26 Reading 
Location: [address] Reading Room      Electronically signed by: 
EMIL FAROOQ M.D. on 2017 09:06 PM

## 2018-10-19 NOTE — EKG
Test Date:    2018-10-18               Test Time:    11:05:35

Technician:   DENNY                                     

                                                     

MEASUREMENT RESULTS:                                       

Intervals:                                           

Rate:         95                                     

MA:           128                                    

QRSD:         78                                     

QT:           376                                    

QTc:          472                                    

Axis:                                                

P:            20                                     

MA:           128                                    

QRS:          -5                                     

T:            15                                     

                                                     

INTERPRETIVE STATEMENTS:                                       

                                                     

Normal sinus rhythm

Nonspecific T wave abnormality

Prolonged QT

Abnormal ECG

Compared to ECG 12/29/2017 07:15:49

T-wave abnormality now present

Prolonged QT interval now present

Sinus tachycardia no longer present

Short MA interval no longer present

ST (T wave) deviation no longer present



Electronically Signed On 10-19-18 06:49:27 CDT by Alvarez Parekh

## 2019-09-08 ENCOUNTER — HOSPITAL ENCOUNTER (INPATIENT)
Dept: HOSPITAL 97 - ER | Age: 59
LOS: 2 days | Discharge: HOME | DRG: 194 | End: 2019-09-10
Attending: FAMILY MEDICINE | Admitting: FAMILY MEDICINE
Payer: SELF-PAY

## 2019-09-08 VITALS — BODY MASS INDEX: 26.7 KG/M2

## 2019-09-08 DIAGNOSIS — J18.9: Primary | ICD-10-CM

## 2019-09-08 DIAGNOSIS — I11.0: ICD-10-CM

## 2019-09-08 DIAGNOSIS — E11.9: ICD-10-CM

## 2019-09-08 DIAGNOSIS — I50.22: ICD-10-CM

## 2019-09-08 DIAGNOSIS — N17.9: ICD-10-CM

## 2019-09-08 LAB
ALBUMIN SERPL BCP-MCNC: 2.9 G/DL (ref 3.4–5)
ALP SERPL-CCNC: 92 U/L (ref 45–117)
ALT SERPL W P-5'-P-CCNC: 26 U/L (ref 12–78)
AST SERPL W P-5'-P-CCNC: 16 U/L (ref 15–37)
BUN BLD-MCNC: 9 MG/DL (ref 7–18)
CKMB CREATINE KINASE MB: < 1 NG/ML (ref 0.3–3.6)
GLUCOSE SERPLBLD-MCNC: 138 MG/DL (ref 74–106)
HCT VFR BLD CALC: 39.9 % (ref 39.6–49)
INR BLD: 1.14
LIPASE SERPL-CCNC: 65 U/L (ref 73–393)
LYMPHOCYTES # SPEC AUTO: 1.9 K/UL (ref 0.7–4.9)
PMV BLD: 7.9 FL (ref 7.6–11.3)
POTASSIUM SERPL-SCNC: 3.7 MMOL/L (ref 3.5–5.1)
RBC # BLD: 4.31 M/UL (ref 4.33–5.43)
TROPONIN (EMERG DEPT USE ONLY): < 0.02 NG/ML (ref 0–0.04)
UA COMPLETE W REFLEX CULTURE PNL UR: (no result)

## 2019-09-08 PROCEDURE — 85610 PROTHROMBIN TIME: CPT

## 2019-09-08 PROCEDURE — 80076 HEPATIC FUNCTION PANEL: CPT

## 2019-09-08 PROCEDURE — 83036 HEMOGLOBIN GLYCOSYLATED A1C: CPT

## 2019-09-08 PROCEDURE — 84145 PROCALCITONIN (PCT): CPT

## 2019-09-08 PROCEDURE — 71046 X-RAY EXAM CHEST 2 VIEWS: CPT

## 2019-09-08 PROCEDURE — 93306 TTE W/DOPPLER COMPLETE: CPT

## 2019-09-08 PROCEDURE — 87081 CULTURE SCREEN ONLY: CPT

## 2019-09-08 PROCEDURE — 87086 URINE CULTURE/COLONY COUNT: CPT

## 2019-09-08 PROCEDURE — 84484 ASSAY OF TROPONIN QUANT: CPT

## 2019-09-08 PROCEDURE — 85730 THROMBOPLASTIN TIME PARTIAL: CPT

## 2019-09-08 PROCEDURE — 93970 EXTREMITY STUDY: CPT

## 2019-09-08 PROCEDURE — 87070 CULTURE OTHR SPECIMN AEROBIC: CPT

## 2019-09-08 PROCEDURE — 82553 CREATINE MB FRACTION: CPT

## 2019-09-08 PROCEDURE — 96365 THER/PROPH/DIAG IV INF INIT: CPT

## 2019-09-08 PROCEDURE — 96368 THER/DIAG CONCURRENT INF: CPT

## 2019-09-08 PROCEDURE — 99285 EMERGENCY DEPT VISIT HI MDM: CPT

## 2019-09-08 PROCEDURE — 71045 X-RAY EXAM CHEST 1 VIEW: CPT

## 2019-09-08 PROCEDURE — 87088 URINE BACTERIA CULTURE: CPT

## 2019-09-08 PROCEDURE — 81015 MICROSCOPIC EXAM OF URINE: CPT

## 2019-09-08 PROCEDURE — 82962 GLUCOSE BLOOD TEST: CPT

## 2019-09-08 PROCEDURE — 87804 INFLUENZA ASSAY W/OPTIC: CPT

## 2019-09-08 PROCEDURE — 80048 BASIC METABOLIC PNL TOTAL CA: CPT

## 2019-09-08 PROCEDURE — 82550 ASSAY OF CK (CPK): CPT

## 2019-09-08 PROCEDURE — 83605 ASSAY OF LACTIC ACID: CPT

## 2019-09-08 PROCEDURE — 87040 BLOOD CULTURE FOR BACTERIA: CPT

## 2019-09-08 PROCEDURE — 36415 COLL VENOUS BLD VENIPUNCTURE: CPT

## 2019-09-08 PROCEDURE — 81003 URINALYSIS AUTO W/O SCOPE: CPT

## 2019-09-08 PROCEDURE — 96361 HYDRATE IV INFUSION ADD-ON: CPT

## 2019-09-08 PROCEDURE — 93005 ELECTROCARDIOGRAM TRACING: CPT

## 2019-09-08 PROCEDURE — 83690 ASSAY OF LIPASE: CPT

## 2019-09-08 PROCEDURE — 83735 ASSAY OF MAGNESIUM: CPT

## 2019-09-08 PROCEDURE — 85025 COMPLETE CBC W/AUTO DIFF WBC: CPT

## 2019-09-08 NOTE — RAD REPORT
EXAM DESCRIPTION:  US - Extrem Venous W Compress Humphrey - 9/8/2019 9:15 pm

 

CLINICAL HISTORY:  SWELLING

Bilateral leg edema and swelling.

 

COMPARISON:  <Comparisons>

 

TECHNIQUE:  Real-time sonographic interrogation of the left and right lower extremity deep venous sys
tems was performed.

 

FINDINGS:  Normal compressibility, flow augmentation, phasic flow and spontaneous flow is identified 
in both the left and right lower extremity deep venous systems.

 

IMPRESSION:  No sonographic evidence of left or right lower extremity deep venous thrombosis.

## 2019-09-08 NOTE — XMS REPORT
Patient Summary Document

 Created on:2019



Patient:MARYANN BELCHER

Sex:Male

:1960

External Reference #:[id number]





Demographics







 Address  [address]



   [place] [address]

 

 Home Phone  [phone]

 

 Work Phone  [phone]

 

 Email Address  NONE

 

 Preferred Language  Unknown

 

 Marital Status  Unknown

 

 Cheondoism Affiliation  Unknown

 

 Race  Unknown

 

 Additional Race(s)  Unavailable

 

 Ethnic Group  Unknown









Author







 Organization  [facility]

 

 Address  [address]



   [place] [address]

 

 Phone  [phone]









Care Team Providers







 Name  Role  Phone

 

 BONY OVIEDO  Unavailable  Unavailable









Problems

This patient has no known problems.



Allergies, Adverse Reactions, Alerts

This patient has no known allergies or adverse reactions.



Medications

This patient has no known medications.



Results







 Test Description  Test Time  Test Comments  Text Results  Atomic Results  
Result Comments









 POCT-GLUCOSE METER  2018 17:53:00    









   Test Item  Value  Reference Range  Comments









 POC-GLUCOSE METER (BEAKER) (test  142 mg/dL    TESTED AT [address]



 hazx=4328)      [facility] [address]



POCT-GLUCOSE [id number] 15:28:00





 Test Item  Value  Reference Range  Comments

 

 POC-GLUCOSE METER (BEAKER)  149 mg/dL    TESTED AT [address]



 (test lxpz=5047)      [facility] [address]



[id number] 06:40:00





 Test Item  Value  Reference Range  Comments

 

 MAGNESIUM (BEAKER) (test code=627)  1.9 mg/dL  1.6-2.6  



BASIC METABOLIC [id number] 06:40:00





 Test Item  Value  Reference Range  Comments

 

 SODIUM (BEAKER) (test  135 meq/L  136-145  



 mahq=807)      

 

 POTASSIUM (BEAKER) (test  3.8 meq/L  3.5-5.1  



 code=379)      

 

 CHLORIDE (BEAKER) (test  106 meq/L    



 code=382)      

 

 CO2 (BEAKER) (test  22 meq/L  22-29  



 code=355)      

 

 BLOOD UREA NITROGEN  7 mg/dL  7-21  



 (BEAKER) (test code=354)      

 

 CREATININE (BEAKER) (test  0.49 mg/dL  0.57-1.25  



 code=358)      

 

 GLUCOSE RANDOM (BEAKER)  101 mg/dL    



 (test code=652)      

 

 CALCIUM (BEAKER) (test  8.4 mg/dL  8.4-10.2  



 code=697)      

 

 EGFR (BEAKER) (test  177 mL/min/1.73 sq m    ESTIMATED GFR IS NOT AS



 code=1092)      ACCURATE AS CREATININE



       CLEARANCE IN PREDICTING



       GLOMERULAR FILTRATION



       RATE. ESTIMATED GFR IS



       NOT APPLICABLE FOR



       DIALYSIS PATIENTS.



CBC W/PLT COUNT &amp; AUTO [id number] 05:35:00





 Test Item  Value  Reference Range  Comments

 

 WHITE BLOOD CELL COUNT (BEAKER) (test code=775)  7.8 K/ L  3.5-10.5  

 

 RED BLOOD CELL COUNT (BEAKER) (test code=761)  4.21 M/ L  4.63-6.08  

 

 HEMOGLOBIN (BEAKER) (test code=410)  12.5 GM/DL  13.7-17.5  

 

 HEMATOCRIT (BEAKER) (test code=411)  37.7 %  40.1-51.0  

 

 MEAN CORPUSCULAR VOLUME (BEAKER) (test code=753)  89.5 fL  79.0-92.2  

 

 MEAN CORPUSCULAR HEMOGLOBIN (BEAKER) (test  29.7 pg  25.7-32.2  



 civb=614)      

 

 MEAN CORPUSCULAR HEMOGLOBIN CONC (BEAKER) (test  33.2 GM/DL  32.3-36.5  



 code=752)      

 

 RED CELL DISTRIBUTION WIDTH (BEAKER) (test  14.8 %  11.6-14.4  



 code=412)      

 

 PLATELET COUNT (BEAKER) (test code=756)  210 K/CU MM  150-450  

 

 MEAN PLATELET VOLUME (BEAKER) (test code=754)  10.3 fL  9.4-12.4  

 

 NUCLEATED RED BLOOD CELLS (BEAKER) (test  0 /100 WBC  0-0  



 code=413)      

 

 NEUTROPHILS RELATIVE PERCENT (BEAKER) (test  69 %    



 code=429)      

 

 LYMPHOCYTES RELATIVE PERCENT (BEAKER) (test  16 %    



 code=430)      

 

 MONOCYTES RELATIVE PERCENT (BEAKER) (test  9 %    



 code=431)      

 

 EOSINOPHILS RELATIVE PERCENT (BEAKER) (test  1 %    



 code=432)      

 

 BASOPHILS RELATIVE PERCENT (BEAKER) (test  1 %    



 code=437)      

 

 NEUTROPHILS ABSOLUTE COUNT (BEAKER) (test  5.38 K/ L  1.78-5.38  



 code=670)      

 

 LYMPHOCYTES ABSOLUTE COUNT (BEAKER) (test  1.25 K/ L  1.32-3.57  



 code=414)      

 

 MONOCYTES ABSOLUTE COUNT (BEAKER) (test  0.66 K/ L  0.30-0.82  



 code=415)      

 

 EOSINOPHILS ABSOLUTE COUNT (BEAKER) (test  0.06 K/ L  0.04-0.54  



 code=416)      

 

 BASOPHILS ABSOLUTE COUNT (BEAKER) (test  0.09 K/ L  0.01-0.08  



 code=417)      

 

 IMMATURE GRANULOCYTES-RELATIVE PERCENT (BEAKER)  4 %  0-1  



 (test code=2801)      



BLOOD [id number] 05:01:00





 Test Item  Value  Reference Range  Comments

 

 CULTURE (BEAKER) (test code=1095)  No growth in 5 days    



BLOOD [id number] 05:01:00





 Test Item  Value  Reference Range  Comments

 

 CULTURE (BEAKER) (test code=1095)  No growth in 5 days    



POCT-GLUCOSE [id number] 20:57:00





 Test Item  Value  Reference Range  Comments

 

 POC-GLUCOSE METER (BEAKER)  262 mg/dL    TESTED AT [address]



 (test ejfj=9443)      [place] [address]



POCT-GLUCOSE [id number] 18:50:00





 Test Item  Value  Reference Range  Comments

 

 POC-GLUCOSE METER (BEAKER)  102 mg/dL    TESTED AT [address]



 (test eikm=7745)      [place] [address]



POCT-GLUCOSE [id number] 12:47:00





 Test Item  Value  Reference Range  Comments

 

 POC-GLUCOSE METER (BEAKER)  190 mg/dL    TESTED AT [address]



 (test bqhf=7140)      [place] [address]



[id number] 05:24:00





 Test Item  Value  Reference Range  Comments

 

 MAGNESIUM (BEAKER) (test code=627)  2.1 mg/dL  1.6-2.6  



BASIC METABOLIC [id number] 05:24:00





 Test Item  Value  Reference Range  Comments

 

 SODIUM (BEAKER) (test  134 meq/L  136-145  



 hgot=241)      

 

 POTASSIUM (BEAKER) (test  3.7 meq/L  3.5-5.1  



 code=379)      

 

 CHLORIDE (BEAKER) (test  105 meq/L    



 code=382)      

 

 CO2 (BEAKER) (test  24 meq/L  22-29  



 code=355)      

 

 BLOOD UREA NITROGEN  7 mg/dL  7-21  



 (BEAKER) (test code=354)      

 

 CREATININE (BEAKER) (test  0.52 mg/dL  0.57-1.25  



 code=358)      

 

 GLUCOSE RANDOM (BEAKER)  106 mg/dL    



 (test code=652)      

 

 CALCIUM (BEAKER) (test  8.0 mg/dL  8.4-10.2  



 code=697)      

 

 EGFR (BEAKER) (test  165 mL/min/1.73 sq m    ESTIMATED GFR IS NOT AS



 code=1092)      ACCURATE AS CREATININE



       CLEARANCE IN PREDICTING



       GLOMERULAR FILTRATION



       RATE. ESTIMATED GFR IS



       NOT APPLICABLE FOR



       DIALYSIS PATIENTS.



CBC W/PLT COUNT &amp; AUTO [id number] 04:52:00





 Test Item  Value  Reference Range  Comments

 

 WHITE BLOOD CELL COUNT (BEAKER) (test code=775)  7.7 K/ L  3.5-10.5  

 

 RED BLOOD CELL COUNT (BEAKER) (test code=761)  4.14 M/ L  4.63-6.08  

 

 HEMOGLOBIN (BEAKER) (test code=410)  12.4 GM/DL  13.7-17.5  

 

 HEMATOCRIT (BEAKER) (test code=411)  37.4 %  40.1-51.0  

 

 MEAN CORPUSCULAR VOLUME (BEAKER) (test code=753)  90.3 fL  79.0-92.2  

 

 MEAN CORPUSCULAR HEMOGLOBIN (BEAKER) (test  30.0 pg  25.7-32.2  



 lbsi=793)      

 

 MEAN CORPUSCULAR HEMOGLOBIN CONC (BEAKER) (test  33.2 GM/DL  32.3-36.5  



 code=752)      

 

 RED CELL DISTRIBUTION WIDTH (BEAKER) (test  14.9 %  11.6-14.4  



 code=412)      

 

 PLATELET COUNT (BEAKER) (test code=756)  202 K/CU MM  150-450  

 

 MEAN PLATELET VOLUME (BEAKER) (test code=754)  9.9 fL  9.4-12.4  

 

 NUCLEATED RED BLOOD CELLS (BEAKER) (test  0 /100 WBC  0-0  



 code=413)      

 

 NEUTROPHILS RELATIVE PERCENT (BEAKER) (test  72 %    



 code=429)      

 

 LYMPHOCYTES RELATIVE PERCENT (BEAKER) (test  13 %    



 code=430)      

 

 MONOCYTES RELATIVE PERCENT (BEAKER) (test  7 %    



 code=431)      

 

 EOSINOPHILS RELATIVE PERCENT (BEAKER) (test  1 %    



 code=432)      

 

 BASOPHILS RELATIVE PERCENT (BEAKER) (test  1 %    



 code=437)      

 

 NEUTROPHILS ABSOLUTE COUNT (BEAKER) (test  5.58 K/ L  1.78-5.38  



 code=670)      

 

 LYMPHOCYTES ABSOLUTE COUNT (BEAKER) (test  1.03 K/ L  1.32-3.57  



 code=414)      

 

 MONOCYTES ABSOLUTE COUNT (BEAKER) (test  0.54 K/ L  0.30-0.82  



 code=415)      

 

 EOSINOPHILS ABSOLUTE COUNT (BEAKER) (test  0.11 K/ L  0.04-0.54  



 code=416)      

 

 BASOPHILS ABSOLUTE COUNT (BEAKER) (test  0.06 K/ L  0.01-0.08  



 code=417)      

 

 IMMATURE GRANULOCYTES-RELATIVE PERCENT (BEAKER)  5 %  0-1  



 (test code=2801)      



POCT-GLUCOSE [id number] 22:11:00





 Test Item  Value  Reference Range  Comments

 

 POC-GLUCOSE METER (BEAKER)  131 mg/dL    TESTED AT [address]



 (test rexy=2533)      [place] [address]



POCT-GLUCOSE [id number] 17:52:00





 Test Item  Value  Reference Range  Comments

 

 POC-GLUCOSE METER (BEAKER)  113 mg/dL    TESTED AT [address]



 (test thpk=3278)      [facility] [address]



MR, CARDIAC, [id number] 16:35:00Reason for exam:-&gt;viabilityFINAL 
REPORT PATIENT ID:   [id number] Cardiac MRI dated 2017 INDICATION: 
This is a 57 year-old male with known ischemic cardiomyopathy presents for 
assessment. Recent angiography demonstrate no coronary artery disease. This 
study is performed in order to quantitate left ventricular function,and to 
determine myocardial viability and damage. TECHNIQUE: [employer]  MRI 
scanner. Morphologic and dynamic cine imaging were performed in multiple 
projections before and after contrast administration.  Thereafter, gadolinium 
was administered, which was followed by viability/scar imaging.  Finally, flow 
quantification sequences were performed to determine the degree of valvular 
dysfunction.   Please refer to the contrast sheet scanned in the EPIC system 
for the amount and route of contrast given. Scanning blood pressure was 90/66. 
Patient weighs 155 pounds, with height of 67 inches. Body surface area is 
approximately 1.82 sq m. FINDINGS: The chest wall and mediastinum appears 
unremarkable.The pericardium and pulmonary arteries appear normal; no 
pericardial effusion is identified in the central pulmonary artery is normal in 
calibre. Limited imaging through the lungs reveals no gross abnormalities; some 
dependent changes are seen in the lung bases.  The cardiac chambers demonstrate 
normal atrioventricular and ventriculoarterial concordance, and systemic and 
pulmonary venous return.   The thoracic aorta is normal in course, calibre, and 
contour. There is no evidence of acute aortic pathology, such as dissection, 
intramural hematoma, or contained rupture.  The left ventricle is normal and 
enlarged with severely global hypokinesis/akinesis.  Quantitative values are as 
follows: XEH=361 cc; NIG=436 cc; stroke volume=63 cc; and ejection fraction=21%
.  Calculated absolute cardiac output=6.2 liters/min.  Absolute left 
ventricular zbmz=149 grams. Index GGGDG=640 cc/sq m confirming left ventricular 
enlargement. No evidence of hypertrophic cardiomyopathy or left ventricular 
noncompaction noted. The right ventricle is normal in size with mild systolic 
dysfunction. Quantitative values are as follows: DPD=678 cc; ESV=97 cc; and 
ejection fraction=34%.   Cine imaging and flow quantification unremarkable 
mitral and aortic valve function. Trace tricuspid regurgitation is present. 
Viability/scar imaging reveals no evidence of prior myocardial infarction. 
However, there is some subtle linear mid myocardial hyperenhancement identified 
in the basal septum, consistent with interstitial fibrosis, a nonspecific 
finding, commonly seen in patient with dilated nonischemic cardiomyopathy. 
Furthermore, this is consistent with raised native myocardial T1 in the 
experimental T1 mapping sequence. There is substantial reduction in left 
ventricular long axis strain, -4.3% (normal MRI reference value for male is 
16.5 + / - 2.2%). Ventricular thrombus is not present. Left atrial enlargement 
is identified. CONCLUSIONS: 1. The left ventricle is enlarged with severe 
global systolic dysfunction. Ejection fraction is quantified to be 21%.   
Quantitative left ventricular functional values are as described above.Viability
/scar imaging is normal.  There is no evidence of prior myocardial damage. No 
imaging evidence to suggest left ventricular noncompaction. There is mid 
myocardial hyperenhancement identified best seen in the basal septum indicating 
interstitial fibrosis, nonspecific finding in patient with cardiomyopathy. 
Substantial reduction in left ventricular long axis strain. 2.  The right 
ventricle is normal in size with overall mild systolic dysfunction.  
Quantitative right ventricular functional veins as described above. 3.  Normal 
aortic and mitral valvular function. 4.  Left atrial enlargement. Signed: Angel Fink MDReport Verified Date/Time:  2018 16:35:20 Reading Location: 
[place] [address] Cardiology MRI    Electronically signed by: ANGEL FINK M.D. 
on 2018 04:35 PMPOCT-GLUCOSE [id number] 13:41:00





 Test Item  Value  Reference Range  Comments

 

 POC-GLUCOSE METER (BEAKER)  131 mg/dL    TESTED AT [facility] [address]



 (test tybl=1229)      [facility] [address]



POCT-GLUCOSE [id number] 08:39:00





 Test Item  Value  Reference Range  Comments

 

 POC-GLUCOSE METER (BEAKER)  127 mg/dL    TESTED AT [facility] [address]



 (test dstg=9172)      [facility] [address]



[id number] 06:51:00





 Test Item  Value  Reference Range  Comments

 

 MAGNESIUM (BEAKER) (test code=627)  1.8 mg/dL  1.6-2.6  



BASIC METABOLIC [id number] 06:51:00





 Test Item  Value  Reference Range  Comments

 

 SODIUM (BEAKER) (test  136 meq/L  136-145  



 nwwq=872)      

 

 POTASSIUM (BEAKER) (test  4.2 meq/L  3.5-5.1  



 code=379)      

 

 CHLORIDE (BEAKER) (test  107 meq/L    



 code=382)      

 

 CO2 (BEAKER) (test  24 meq/L  22-29  



 code=355)      

 

 BLOOD UREA NITROGEN  6 mg/dL  7-21  



 (BEAKER) (test code=354)      

 

 CREATININE (BEAKER) (test  0.53 mg/dL  0.57-1.25  



 code=358)      

 

 GLUCOSE RANDOM (BEAKER)  111 mg/dL    



 (test code=652)      

 

 CALCIUM (BEAKER) (test  8.1 mg/dL  8.4-10.2  



 code=697)      

 

 EGFR (BEAKER) (test  161 mL/min/1.73 sq m    ESTIMATED GFR IS NOT AS



 code=1092)      ACCURATE AS CREATININE



       CLEARANCE IN PREDICTING



       GLOMERULAR FILTRATION



       RATE. ESTIMATED GFR IS



       NOT APPLICABLE FOR



       DIALYSIS PATIENTS.



CBC W/PLT COUNT &amp; AUTO [id number] 05:57:00





 Test Item  Value  Reference Range  Comments

 

 WHITE BLOOD CELL COUNT (BEAKER) (test code=775)  8.2 K/ L  3.5-10.5  

 

 RED BLOOD CELL COUNT (BEAKER) (test code=761)  4.27 M/ L  4.63-6.08  

 

 HEMOGLOBIN (BEAKER) (test code=410)  12.7 GM/DL  13.7-17.5  

 

 HEMATOCRIT (BEAKER) (test code=411)  38.7 %  40.1-51.0  

 

 MEAN CORPUSCULAR VOLUME (BEAKER) (test code=753)  90.6 fL  79.0-92.2  

 

 MEAN CORPUSCULAR HEMOGLOBIN (BEAKER) (test  29.7 pg  25.7-32.2  



 wfwi=130)      

 

 MEAN CORPUSCULAR HEMOGLOBIN CONC (BEAKER) (test  32.8 GM/DL  32.3-36.5  



 code=752)      

 

 RED CELL DISTRIBUTION WIDTH (BEAKER) (test  14.9 %  11.6-14.4  



 code=412)      

 

 PLATELET COUNT (BEAKER) (test code=756)  207 K/CU MM  150-450  

 

 MEAN PLATELET VOLUME (BEAKER) (test code=754)  10.2 fL  9.4-12.4  

 

 NUCLEATED RED BLOOD CELLS (BEAKER) (test  0 /100 WBC  0-0  



 code=413)      

 

 NEUTROPHILS RELATIVE PERCENT (BEAKER) (test  72 %    



 code=429)      

 

 LYMPHOCYTES RELATIVE PERCENT (BEAKER) (test  14 %    



 code=430)      

 

 MONOCYTES RELATIVE PERCENT (BEAKER) (test  7 %    



 code=431)      

 

 EOSINOPHILS RELATIVE PERCENT (BEAKER) (test  2 %    



 code=432)      

 

 BASOPHILS RELATIVE PERCENT (BEAKER) (test  1 %    



 code=437)      

 

 NEUTROPHILS ABSOLUTE COUNT (BEAKER) (test  5.92 K/ L  1.78-5.38  



 code=670)      

 

 LYMPHOCYTES ABSOLUTE COUNT (BEAKER) (test  1.11 K/ L  1.32-3.57  



 code=414)      

 

 MONOCYTES ABSOLUTE COUNT (BEAKER) (test  0.55 K/ L  0.30-0.82  



 code=415)      

 

 EOSINOPHILS ABSOLUTE COUNT (BEAKER) (test  0.13 K/ L  0.04-0.54  



 code=416)      

 

 BASOPHILS ABSOLUTE COUNT (BEAKER) (test  0.09 K/ L  0.01-0.08  



 code=417)      

 

 IMMATURE GRANULOCYTES-RELATIVE PERCENT (BEAKER)  5 %  0-1  



 (test code=2801)      



POCT-GLUCOSE [id number] 21:08:00





 Test Item  Value  Reference Range  Comments

 

 POC-GLUCOSE METER (BEAKER)  195 mg/dL    TESTED AT [address]



 (test irmt=4620)      [facility] [address]



POCT-GLUCOSE [id number] 18:00:00





 Test Item  Value  Reference Range  Comments

 

 POC-GLUCOSE METER (BEAKER)  175 mg/dL    TESTED AT [address]



 (test pwne=7222)      [facility] [address]



POCT-GLUCOSE [id number] 12:45:00





 Test Item  Value  Reference Range  Comments

 

 POC-GLUCOSE METER (BEAKER)  183 mg/dL    TESTED AT [address]



 (test ygqa=0823)      [facility] [address]



POCT-GLUCOSE [id number] 08:34:00





 Test Item  Value  Reference Range  Comments

 

 POC-GLUCOSE METER (BEAKER)  127 mg/dL    TESTED AT [address]



 (test xigi=4801)      [facility] [address]



PROTEIN, 24 HOUR [id number] 08:28:00





 Test Item  Value  Reference Range  Comments

 

 PROTEIN, 24HR URINE (BEAKER) (test code=1570)  < mg/24hr  0-300  

 

 VOLUME, TOTAL (BEAKER) (test code=1457)  1400 ml    

 

 PROTEIN, URINE (BEAKER) (test code=1569)  < mg/dL  0-14  



MICROALBUMIN, 24 HOUR [id number] 08:24:00





 Test Item  Value  Reference Range  Comments

 

 VOLUME, TOTAL (BEAKER) (test code=1457)  1400 ml    

 

 MICROALBUMIN URINE (BEAKER) (test code=1794)  < mg/dL    

 

 MICROALBUMIN 24 HOUR URINE (BEAKER) (test  < mg/24 hrs  0-30  



 code=623)      



URINE [id number] 07:44:00





 Test Item  Value  Reference Range  Comments

 

 CULTURE (BEAKER) (test      10-19,000 col/mL Candida



 code=1095)      albicans



[id number] 05:03:00





 Test Item  Value  Reference Range  Comments

 

 MAGNESIUM (BEAKER) (test  1.8 mg/dL  1.6-2.6  Specimen slightly hemolyzed



 code=627)      



BASIC METABOLIC [id number] 05:03:00





 Test Item  Value  Reference Range  Comments

 

 SODIUM (BEAKER) (test  134 meq/L  136-145  



 szhy=285)      

 

 POTASSIUM (BEAKER) (test  4.3 meq/L  3.5-5.1  Specimen slightly



 code=379)      hemolyzed

 

 CHLORIDE (BEAKER) (test  104 meq/L    



 code=382)      

 

 CO2 (BEAKER) (test  24 meq/L  22-29  



 code=355)      

 

 BLOOD UREA NITROGEN  9 mg/dL  7-21  



 (BEAKER) (test code=354)      

 

 CREATININE (BEAKER) (test  0.55 mg/dL  0.57-1.25  Specimen slightly



 code=358)      hemolyzed

 

 GLUCOSE RANDOM (BEAKER)  104 mg/dL    



 (test code=652)      

 

 CALCIUM (BEAKER) (test  8.1 mg/dL  8.4-10.2  



 code=697)      

 

 EGFR (BEAKER) (test  155 mL/min/1.73 sq m    ESTIMATED GFR IS NOT AS



 code=1092)      ACCURATE AS CREATININE



       CLEARANCE IN PREDICTING



       GLOMERULAR FILTRATION



       RATE. ESTIMATED GFR IS



       NOT APPLICABLE FOR



       DIALYSIS PATIENTS.



CBC W/PLT COUNT &amp; AUTO [id number] 04:09:00





 Test Item  Value  Reference Range  Comments

 

 WHITE BLOOD CELL COUNT (BEAKER) (test code=775)  8.9 K/ L  3.5-10.5  

 

 RED BLOOD CELL COUNT (BEAKER) (test code=761)  4.56 M/ L  4.63-6.08  

 

 HEMOGLOBIN (BEAKER) (test code=410)  13.5 GM/DL  13.7-17.5  

 

 HEMATOCRIT (BEAKER) (test code=411)  41.6 %  40.1-51.0  

 

 MEAN CORPUSCULAR VOLUME (BEAKER) (test code=753)  91.2 fL  79.0-92.2  

 

 MEAN CORPUSCULAR HEMOGLOBIN (BEAKER) (test  29.6 pg  25.7-32.2  



 lwln=794)      

 

 MEAN CORPUSCULAR HEMOGLOBIN CONC (BEAKER) (test  32.5 GM/DL  32.3-36.5  



 code=752)      

 

 RED CELL DISTRIBUTION WIDTH (BEAKER) (test  15.0 %  11.6-14.4  



 code=412)      

 

 PLATELET COUNT (BEAKER) (test code=756)  217 K/CU MM  150-450  

 

 MEAN PLATELET VOLUME (BEAKER) (test code=754)  9.9 fL  9.4-12.4  

 

 NUCLEATED RED BLOOD CELLS (BEAKER) (test  0 /100 WBC  0-0  



 code=413)      

 

 NEUTROPHILS RELATIVE PERCENT (BEAKER) (test  74 %    



 code=429)      

 

 LYMPHOCYTES RELATIVE PERCENT (BEAKER) (test  13 %    



 code=430)      

 

 MONOCYTES RELATIVE PERCENT (BEAKER) (test  5 %    



 code=431)      

 

 EOSINOPHILS RELATIVE PERCENT (BEAKER) (test  1 %    



 code=432)      

 

 BASOPHILS RELATIVE PERCENT (BEAKER) (test  1 %    



 code=437)      

 

 NEUTROPHILS ABSOLUTE COUNT (BEAKER) (test  6.60 K/ L  1.78-5.38  



 code=670)      

 

 LYMPHOCYTES ABSOLUTE COUNT (BEAKER) (test  1.16 K/ L  1.32-3.57  



 code=414)      

 

 MONOCYTES ABSOLUTE COUNT (BEAKER) (test  0.48 K/ L  0.30-0.82  



 code=415)      

 

 EOSINOPHILS ABSOLUTE COUNT (BEAKER) (test  0.10 K/ L  0.04-0.54  



 code=416)      

 

 BASOPHILS ABSOLUTE COUNT (BEAKER) (test  0.07 K/ L  0.01-0.08  



 code=417)      

 

 IMMATURE GRANULOCYTES-RELATIVE PERCENT (BEAKER)  5 %  0-1  



 (test code=2801)      



POCT-GLUCOSE [id number] 21:03:00





 Test Item  Value  Reference Range  Comments

 

 POC-GLUCOSE METER (BEAKER)  198 mg/dL    TESTED AT [address]



 (test shhh=0274)      [place] [id number]



POCT-GLUCOSE [id number] 16:56:00





 Test Item  Value  Reference Range  Comments

 

 POC-GLUCOSE METER (BEAKER)  126 mg/dL    TESTED AT [address]



 (test bqku=1949)      [place] [id number]



CBC W/PLT COUNT &amp; AUTO [id number] 13:38:00





 Test Item  Value  Reference Range  Comments

 

 WHITE BLOOD CELL COUNT (BEAKER) (test code=775)  9.1 K/ L  3.5-10.5  

 

 RED BLOOD CELL COUNT (BEAKER) (test code=761)  4.96 M/ L  4.63-6.08  

 

 HEMOGLOBIN (BEAKER) (test code=410)  14.7 GM/DL  13.7-17.5  

 

 HEMATOCRIT (BEAKER) (test code=411)  45.3 %  40.1-51.0  

 

 MEAN CORPUSCULAR VOLUME (BEAKER) (test code=753)  91.3 fL  79.0-92.2  

 

 MEAN CORPUSCULAR HEMOGLOBIN (BEAKER) (test  29.6 pg  25.7-32.2  



 vciy=889)      

 

 MEAN CORPUSCULAR HEMOGLOBIN CONC (BEAKER) (test  32.5 GM/DL  32.3-36.5  



 code=752)      

 

 RED CELL DISTRIBUTION WIDTH (BEAKER) (test  15.1 %  11.6-14.4  



 code=412)      

 

 PLATELET COUNT (BEAKER) (test code=756)  237 K/CU MM  150-450  

 

 MEAN PLATELET VOLUME (BEAKER) (test code=754)  9.7 fL  9.4-12.4  

 

 NUCLEATED RED BLOOD CELLS (BEAKER) (test  0 /100 WBC  0-0  



 code=413)      

 

 NEUTROPHILS RELATIVE PERCENT (BEAKER) (test  72 %    



 code=429)      

 

 LYMPHOCYTES RELATIVE PERCENT (BEAKER) (test  15 %    



 code=430)      

 

 MONOCYTES RELATIVE PERCENT (BEAKER) (test  5 %    



 code=431)      

 

 EOSINOPHILS RELATIVE PERCENT (BEAKER) (test  1 %    



 code=432)      

 

 BASOPHILS RELATIVE PERCENT (BEAKER) (test  1 %    



 code=437)      

 

 NEUTROPHILS ABSOLUTE COUNT (BEAKER) (test  6.54 K/ L  1.78-5.38  



 code=670)      

 

 LYMPHOCYTES ABSOLUTE COUNT (BEAKER) (test  1.34 K/ L  1.32-3.57  



 code=414)      

 

 MONOCYTES ABSOLUTE COUNT (BEAKER) (test  0.46 K/ L  0.30-0.82  



 code=415)      

 

 EOSINOPHILS ABSOLUTE COUNT (BEAKER) (test  0.11 K/ L  0.04-0.54  



 code=416)      

 

 BASOPHILS ABSOLUTE COUNT (BEAKER) (test  0.08 K/ L  0.01-0.08  



 code=417)      

 

 IMMATURE GRANULOCYTES-RELATIVE PERCENT (BEAKER)  6 %  0-1  



 (test code=2801)      



(MANUAL DIFFERENTIAL)2018 13:38:00





 Test Item  Value  Reference Range  Comments

 

 TOTAL COUNTED (BEAKER) (test code=1351)      

 

 WBC MORPHOLOGY (BEAKER) (test code=487)  Normal    

 

 PLT MORPHOLOGY (BEAKER) (test code=486)  Normal    

 

 RBC MORPHOLOGY (BEAKER) (test code=762)  Normal    



LIPID [id number] 13:17:00





 Test Item  Value  Reference Range  Comments

 

 TRIGLYCERIDES (BEAKER) (test code=540)  222 mg/dL    

 

 CHOLESTEROL (BEAKER) (test code=631)  214 mg/dL    

 

 HDL CHOLESTEROL (BEAKER) (test code=976)  29 mg/dL    

 

 LDL CHOLESTEROL CALCULATED (BEAKER) (test  141 mg/dL    



 code=633)      



Triglyceride Reference Range:   Low Risk         &lt;150   Borderline    150-
199   High Risk     200-499   Very High Risk  &gt;=500Cholesterol Reference 
Range:   Low Risk         &lt;200   Borderline 200-239    High Risk        &gt;
240HDL Cholesterol Reference Range:   Low Risk         &gt;=60   High Risk     
    &lt;40LDL Cholesterol Reference Range:   Optimal          &lt;100   Near 
Optimal  100-129   Borderline    130-159   High          160-189   Very High   
    &gt;=190POCT-GLUCOSE [id number] 08:05:00





 Test Item  Value  Reference Range  Comments

 

 POC-GLUCOSE METER (BEAKER)  129 mg/dL    TESTED AT [facility] [address] [facility]



 (test uuil=4143)      [facility] [address]



[id number] 04:40:00





 Test Item  Value  Reference Range  Comments

 

 MAGNESIUM (BEAKER) (test code=627)  2.0 mg/dL  1.6-2.6  



BASIC METABOLIC [id number] 04:40:00





 Test Item  Value  Reference Range  Comments

 

 SODIUM (BEAKER) (test  137 meq/L  136-145  



 pkio=550)      

 

 POTASSIUM (BEAKER) (test  4.5 meq/L  3.5-5.1  



 code=379)      

 

 CHLORIDE (BEAKER) (test  101 meq/L    



 code=382)      

 

 CO2 (BEAKER) (test  30 meq/L  22-29  



 code=355)      

 

 BLOOD UREA NITROGEN  13 mg/dL  7-21  



 (BEAKER) (test code=354)      

 

 CREATININE (BEAKER) (test  0.66 mg/dL  0.57-1.25  



 code=358)      

 

 GLUCOSE RANDOM (BEAKER)  110 mg/dL    



 (test code=652)      

 

 CALCIUM (BEAKER) (test  8.8 mg/dL  8.4-10.2  



 code=697)      

 

 EGFR (BEAKER) (test  125 mL/min/1.73 sq m    ESTIMATED GFR IS NOT AS



 code=1092)      ACCURATE AS CREATININE



       CLEARANCE IN PREDICTING



       GLOMERULAR FILTRATION



       RATE. ESTIMATED GFR IS



       NOT APPLICABLE FOR



       DIALYSIS PATIENTS.



[id number] 04:27:00





 Test Item  Value  Reference Range  Comments

 

 PROTIME (BEAKER) (test code=759)  13.1 seconds  11.7-14.7  

 

 INR (BEAKER) (test code=370)  1.0  <=5.9  

 

 PARTIAL THROMBOPLASTIN TIME (BEAKER) (test  28.6 seconds  22.5-36.0  



 code=760)      



RECOMMENDED COUMADIN/WARFARIN INR THERAPY RANGESSTANDARD DOSE: 2.0 - 3.0   
Includes: PROPHYLAXIS forvenous thrombosis, systemic embolization; TREATMENT 
for venous thrombosis and/or pulmonary embolus.HIGH RISK: Target INR is 2.5-3.5 
for patients with mechanical heart valves.POCT-GLUCOSE [id number] 21:33:00





 Test Item  Value  Reference Range  Comments

 

 POC-GLUCOSE METER (BEAKER)  223 mg/dL    TESTED AT [address]



 (test xthr=3350)      [place] [id number]



POCT-GLUCOSE [id number] 18:07:00





 Test Item  Value  Reference Range  Comments

 

 POC-GLUCOSE METER (BEAKER)  114 mg/dL    TESTED AT [address]



 (test pusy=5885)      [place] [address]



(MANUAL DIFFERENTIAL)2018 15:42:00





 Test Item  Value  Reference Range  Comments

 

 NEUTROPHILS - REL (DIFF) (BEAKER) (test  74 %    



 code=1359)      

 

 LYMPHOCYTES - REL (DIFF) (BEAKER) (test  11 %    



 code=1360)      

 

 MONOCYTES - REL (DIFF) (BEAKER) (test code=1361)  8 %    

 

 METAMYELOCYTES-REL (DIFF) (BEAKER) (test  1 %  0-0  



 code=258)      

 

 MYELOCYTES-REL (DIFF) (BEAKER) (test code=1594)  1 %  0-0  

 

 BANDS - REL (DIFF) (BEAKER) (test code=1348)  5 %  0-10  

 

 NEUTROPHILS - ABS (DIFF) (BEAKER) (test  6.81 K/ L  1.80-8.00  



 code=1365)      

 

 LYMPHOCYTES - ABS (DIFF) (BEAKER) (test  1.01 K/ L  1.48-4.50  



 code=1366)      

 

 MONOCYTES - ABS (DIFF) (BEAKER) (test code=1367)  0.74 K/ L  0.00-1.30  

 

 METAMYELOCTYES - ABS (DIFF) (BEAKER) (test  0.09 K/ L  0.00-0.00  



 code=261)      

 

 BANDS-ABS (DIFF) (BEAKER) (test code=1349)  0.5 K/ L  0.0-0.8  

 

 MYELOCYTES-ABS (DIFF) (BEAKER) (test code=1593)  0.09 K/ L  0.00-0.00  

 

 TOTAL COUNTED (BEAKER) (test code=1351)  100    

 

 BANDS + SEGMENTED NEUTROPHILS (BEAKER) (test  7.27    



 code=1352)      

 

 WBC MORPHOLOGY (BEAKER) (test code=487)  Normal    

 

 PLT MORPHOLOGY (BEAKER) (test code=486)  Normal    

 

 POIKILOCYTES (BEAKER) (test code=966)  2+ moderate    

 

 POLYCHROMATOPHILLIC RBCS(BEAKER) (test code=478)  1+ few    



CBC W/PLT COUNT &amp; AUTO [id number] 15:41:00





 Test Item  Value  Reference Range  Comments

 

 WHITE BLOOD CELL COUNT (BEAKER) (test code=775)  9.2 K/ L  3.5-10.5  

 

 RED BLOOD CELL COUNT (BEAKER) (test code=761)  4.81 M/ L  4.63-6.08  

 

 HEMOGLOBIN (BEAKER) (test code=410)  14.1 GM/DL  13.7-17.5  

 

 HEMATOCRIT (BEAKER) (test code=411)  44.0 %  40.1-51.0  

 

 MEAN CORPUSCULAR VOLUME (BEAKER) (test code=753)  91.5 fL  79.0-92.2  

 

 MEAN CORPUSCULAR HEMOGLOBIN (BEAKER) (test  29.3 pg  25.7-32.2  



 bzem=269)      

 

 MEAN CORPUSCULAR HEMOGLOBIN CONC (BEAKER) (test  32.0 GM/DL  32.3-36.5  



 code=752)      

 

 RED CELL DISTRIBUTION WIDTH (BEAKER) (test  15.2 %  11.6-14.4  



 code=412)      

 

 PLATELET COUNT (BEAKER) (test code=756)  196 K/CU MM  150-450  

 

 MEAN PLATELET VOLUME (BEAKER) (test code=754)  10.4 fL  9.4-12.4  

 

 NUCLEATED RED BLOOD CELLS (BEAKER) (test  0 /100 WBC  0-0  



 code=413)      

 

 NEUTROPHILS RELATIVE PERCENT (BEAKER) (test  76 %    



 code=429)      

 

 LYMPHOCYTES RELATIVE PERCENT (BEAKER) (test  12 %    



 code=430)      

 

 MONOCYTES RELATIVE PERCENT (BEAKER) (test  6 %    



 code=431)      

 

 EOSINOPHILS RELATIVE PERCENT (BEAKER) (test  1 %    



 code=432)      

 

 BASOPHILS RELATIVE PERCENT (BEAKER) (test  0 %    



 code=437)      

 

 NEUTROPHILS ABSOLUTE COUNT (BEAKER) (test  6.95 K/ L  1.78-5.38  



 code=670)      

 

 LYMPHOCYTES ABSOLUTE COUNT (BEAKER) (test  1.07 K/ L  1.32-3.57  



 code=414)      

 

 MONOCYTES ABSOLUTE COUNT (BEAKER) (test  0.51 K/ L  0.30-0.82  



 code=415)      

 

 EOSINOPHILS ABSOLUTE COUNT (BEAKER) (test  0.10 K/ L  0.04-0.54  



 code=416)      

 

 BASOPHILS ABSOLUTE COUNT (BEAKER) (test  0.01 K/ L  0.01-0.08  



 code=417)      

 

 IMMATURE GRANULOCYTES-RELATIVE PERCENT (BEAKER)  6 %  0-1  



 (test code=2801)      



POCT-GLUCOSE [id number] 11:48:00





 Test Item  Value  Reference Range  Comments

 

 POC-GLUCOSE METER (BEAKER)  272 mg/dL    TESTED AT [address]



 (test unwk=1276)      [place] [address]



POCT-GLUCOSE [id number] 07:34:00





 Test Item  Value  Reference Range  Comments

 

 POC-GLUCOSE METER (BEAKER)  125 mg/dL    TESTED AT [address]



 (test jemg=8028)      [place] [address]



[id number] 05:43:00





 Test Item  Value  Reference Range  Comments

 

 MAGNESIUM (BEAKER) (test code=627)  2.0 mg/dL  1.6-2.6  



BASIC METABOLIC [id number] 05:43:00





 Test Item  Value  Reference Range  Comments

 

 SODIUM (BEAKER) (test  139 meq/L  136-145  



 kcwj=277)      

 

 POTASSIUM (BEAKER) (test  3.7 meq/L  3.5-5.1  



 code=379)      

 

 CHLORIDE (BEAKER) (test  102 meq/L    



 code=382)      

 

 CO2 (BEAKER) (test  28 meq/L  22-29  



 code=355)      

 

 BLOOD UREA NITROGEN  16 mg/dL  7-21  



 (BEAKER) (test code=354)      

 

 CREATININE (BEAKER) (test  0.58 mg/dL  0.57-1.25  



 code=358)      

 

 GLUCOSE RANDOM (BEAKER)  130 mg/dL    



 (test code=652)      

 

 CALCIUM (BEAKER) (test  8.8 mg/dL  8.4-10.2  



 code=697)      

 

 EGFR (BEAKER) (test  145 mL/min/1.73 sq m    ESTIMATED GFR IS NOT AS



 code=1092)      ACCURATE AS CREATININE



       CLEARANCE IN PREDICTING



       GLOMERULAR FILTRATION



       RATE. ESTIMATED GFR IS



       NOT APPLICABLE FOR



       DIALYSIS PATIENTS.



URINALYSIS W/ [id number] 01:48:00





 Test Item  Value  Reference Range  Comments

 

 COLOR (BEAKER) (test code=470)  Yellow    

 

 CLARITY (BEAKER) (test code=469)  Clear    

 

 SPECIFIC GRAVITY UA (BEAKER) (test code=468)  1.017  1.001-1.035  

 

 PH UA (BEAKER) (test code=467)  7.0  5.0-8.0  

 

 PROTEIN UA (BEAKER) (test code=464)  Negative  Negative  

 

 GLUCOSE UA (BEAKER) (test code=365)  300 mg/dL  Negative  

 

 KETONES UA (BEAKER) (test code=371)  10 mg/dL  Negative  

 

 BILIRUBIN UA (BEAKER) (test code=462)  Negative  Negative  

 

 BLOOD UA (BEAKER) (test code=461)  Negative  Negative  

 

 NITRITE UA (BEAKER) (test code=465)  Negative  Negative  

 

 LEUKOCYTE ESTERASE UA (BEAKER) (test code=466)  Negative  Negative  

 

 UROBILINOGEN UA (BEAKER) (test code=463)  2.0 mg/dL  0.2-1.0  

 

 RBC UA (BEAKER) (test code=519)  < /HPF    

 

 WBC UA (BEAKER) (test code=520)  1 /HPF    

 

 SQUAMOUS EPITHELIAL (BEAKER) (test code=516)  < /HPF    

 

 SOURCE(BEAKER) (test code=4638)  Urine, Voided    



HEMOGLOBIN [id number] 22:45:00





 Test Item  Value  Reference Range  Comments

 

 HEMOGLOBIN A1C (BEAKER) (test code=368)  9.2 %  4.3-6.1  



TROPONIN -41-66 21:44:00





 Test Item  Value  Reference Range  Comments

 

 TROPONIN I (BEAKER) (test code=397)  0.32 ng/mL  0.00-0.03  








 Test Item  Value  Reference Range  Comments

 

 POC-GLUCOSE METER (BEAKER)  286 mg/dL    TESTED AT [facility] [address] [facility]



 (test kskc=0212)      [place] [facility] [address]



TSH/FREE T4 IF [id number] 21:42:00





 Test Item  Value  Reference Range  Comments

 

 THYROID STIMULATING HORMONE (BEAKER) (test  1.03 uIU/mL  0.35-4.94  



 code=772)      



PROTHROMBIN [id number] 21:33:00





 Test Item  Value  Reference Range  Comments

 

 PROTIME (BEAKER) (test code=759)  13.1 seconds  11.7-14.7  

 

 INR (BEAKER) (test code=370)  1.0  <=5.9  



RECOMMENDED COUMADIN/WARFARIN INR THERAPY RANGESSTANDARD DOSE: 2.0 - 3.0   
Includes: PROPHYLAXIS forvenous thrombosis, systemic embolization; TREATMENT 
for venous thrombosis and/or pulmonary embolus.HIGH RISK: Target INR is 2.5-3.5 
for patients with mechanical heart valves.B-TYPE NATRIURETIC FACTOR (BNP)2017 21:28:00





 Test Item  Value  Reference Range  Comments

 

 B-TYPE NATRIURETIC PEPTIDE (BEAKER) (test  825 pg/mL  0-100  



 code=700)      



[id number] 21:24:00





 Test Item  Value  Reference Range  Comments

 

 MAGNESIUM (BEAKER) (test code=627)  2.0 mg/dL  1.6-2.6  



BASIC METABOLIC [id number] 21:24:00





 Test Item  Value  Reference Range  Comments

 

 SODIUM (BEAKER) (test  137 meq/L  136-145  



 offx=622)      

 

 POTASSIUM (BEAKER) (test  3.8 meq/L  3.5-5.1  



 code=379)      

 

 CHLORIDE (BEAKER) (test  98 meq/L    



 code=382)      

 

 CO2 (BEAKER) (test  30 meq/L  22-29  



 code=355)      

 

 BLOOD UREA NITROGEN  18 mg/dL  7-21  



 (BEAKER) (test code=354)      

 

 CREATININE (BEAKER) (test  0.74 mg/dL  0.57-1.25  



 code=358)      

 

 GLUCOSE RANDOM (BEAKER)  267 mg/dL    



 (test code=652)      

 

 CALCIUM (BEAKER) (test  8.9 mg/dL  8.4-10.2  



 code=697)      

 

 EGFR (BEAKER) (test  110 mL/min/1.73 sq m    ESTIMATED GFR IS NOT AS



 code=1092)      ACCURATE AS CREATININE



       CLEARANCE IN PREDICTING



       GLOMERULAR FILTRATION



       RATE. ESTIMATED GFR IS



       NOT APPLICABLE FOR



       DIALYSIS PATIENTS.



HEPATIC FUNCTION [id number] 21:24:00





 Test Item  Value  Reference Range  Comments

 

 TOTAL PROTEIN (BEAKER) (test code=770)  5.9 gm/dL  6.0-8.3  

 

 ALBUMIN (BEAKER) (test code=1145)  3.2 g/dL  3.5-5.0  

 

 BILIRUBIN TOTAL (BEAKER) (test code=377)  1.1 mg/dL  0.2-1.2  

 

 BILIRUBIN DIRECT (BEAKER) (test code=706)  0.3 mg/dL  0.1-0.5  

 

 ALKALINE PHOSPHATASE (BEAKER) (test code=346)  105 U/L    

 

 AST (SGOT) (BEAKER) (test code=353)  13 U/L  5-34  

 

 ALT (SGPT) (BEAKER) (test code=347)  37 U/L  6-55  



CBC W/PLT COUNT &amp; AUTO [id number] 21:24:00





 Test Item  Value  Reference Range  Comments

 

 WHITE BLOOD CELL COUNT (BEAKER) (test code=775)  8.9 K/ L  3.5-10.5  

 

 RED BLOOD CELL COUNT (BEAKER) (test code=761)  4.91 M/ L  4.63-6.08  

 

 HEMOGLOBIN (BEAKER) (test code=410)  14.4 GM/DL  13.7-17.5  

 

 HEMATOCRIT (BEAKER) (test code=411)  44.4 %  40.1-51.0  

 

 MEAN CORPUSCULAR VOLUME (BEAKER) (test code=753)  90.4 fL  79.0-92.2  

 

 MEAN CORPUSCULAR HEMOGLOBIN (BEAKER) (test  29.3 pg  25.7-32.2  



 xuwr=875)      

 

 MEAN CORPUSCULAR HEMOGLOBIN CONC (BEAKER) (test  32.4 GM/DL  32.3-36.5  



 code=752)      

 

 RED CELL DISTRIBUTION WIDTH (BEAKER) (test  15.0 %  11.6-14.4  



 code=412)      

 

 PLATELET COUNT (BEAKER) (test code=756)  249 K/CU MM  150-450  

 

 MEAN PLATELET VOLUME (BEAKER) (test code=754)  10.0 fL  9.4-12.4  

 

 NUCLEATED RED BLOOD CELLS (BEAKER) (test  0 /100 WBC  0-0  



 code=413)      

 

 NEUTROPHILS RELATIVE PERCENT (BEAKER) (test  82 %    



 code=429)      

 

 LYMPHOCYTES RELATIVE PERCENT (BEAKER) (test  9 %    



 code=430)      

 

 MONOCYTES RELATIVE PERCENT (BEAKER) (test  5 %    



 code=431)      

 

 EOSINOPHILS RELATIVE PERCENT (BEAKER) (test  0 %    



 code=432)      

 

 BASOPHILS RELATIVE PERCENT (BEAKER) (test  0 %    



 code=437)      

 

 NEUTROPHILS ABSOLUTE COUNT (BEAKER) (test  7.24 K/ L  1.78-5.38  



 code=670)      

 

 LYMPHOCYTES ABSOLUTE COUNT (BEAKER) (test  0.80 K/ L  1.32-3.57  



 code=414)      

 

 MONOCYTES ABSOLUTE COUNT (BEAKER) (test  0.40 K/ L  0.30-0.82  



 code=415)      

 

 EOSINOPHILS ABSOLUTE COUNT (BEAKER) (test  0.01 K/ L  0.04-0.54  



 code=416)      

 

 BASOPHILS ABSOLUTE COUNT (BEAKER) (test  0.01 K/ L  0.01-0.08  



 code=417)      

 

 IMMATURE GRANULOCYTES-RELATIVE PERCENT (BEAKER)  5 %  0-1  



 (test code=2801)      



LACTIC ACID, VENOUS, WHOLE [id number] 21:18:00





 Test Item  Value  Reference Range  Comments

 

 LACTATE BLOOD VENOUS (2)  1.2 mmol/L  0.5-2.2  Specimen moderately hemolyzed



 (BEAKER) (test code=2872)      



Effective 2016: Units/Reference Range ChangeNew: 0.5-2.2 mmol/L  Previous: 5
-20 mg/dLRAD, CHEST, 1 VIEW, NON [id number] 21:06:00Reason for exam:-&gt;
shortness of breathShould this be performed at the bedside?-&gt;YesFINAL REPORT 
PATIENT ID:   [id number]  History: Shortness of breath. Comparison: None. Findings
: A single view of the chest is submitted. The cardiac silhouette is prominent 
in size but magnified by low lung volumes and portable technique.  There is 
central pulmonary vascular congestion. Bilateral interstitial and patchy mid 
and lower lung airspace opacities may reflect a combination of atelectasis 
andedema but pneumonitis should be excluded clinically. A small right pleural 
effusion is suspected.   There is no pneumothorax or acute bony abnormality. 
Signed: Emil Farooq MDReport Verified Date/Time:  2017 21:06:26 Reading 
Location: [address] Reading Room      Electronically signed by: 
EMIL FAROOQ M.D. on 2017 09:06 PM

## 2019-09-08 NOTE — EDPHYS
Physician Documentation                                                                           

 [facility]                                                                 

Name: Gregory Garvin                                                                            

Age: 59 yrs                                                                                       

Sex: Male                                                                                         

: 1960                                                                                   

MRN: [id number]                                                                                   

Arrival Date: 2019                                                                          

Time: 16:42                                                                                       

Account#: [id number]                                                                            

Bed 20                                                                                            

Private MD:                                                                                       

ED Physician Claus Yost                                                                         

HPI:                                                                                              

                                                                                             

20:17 This 59 yrs old  Male presents to ER via Ambulatory with complaints of Fever.   snw 

20:17 The patient reports fever, not measured (subjective), chills. Onset: The                snw 

      symptoms/episode began/occurred gradually, 3 day(s) ago, and became persistent.             

      Associated signs and symptoms: Pertinent positives: lower ext edema. Severity of            

      symptoms: At their worst the symptoms were moderate in the emergency department the         

      symptoms have improved. It is unknown whether or not the patient has had similar            

      symptoms in the past. PCP is clinic .                                                       

                                                                                                  

Historical:                                                                                       

- Allergies:                                                                                      

17:15 NKA;                                                                                    aj1 

- Home Meds:                                                                                      

17:15 Metformin Oral [Active]; Lisinopril Oral [Active];                                      aj1 

- PMHx:                                                                                           

17:15 Asthma; Diabetes - NIDDM; Hypertension;                                                 aj1 

                                                                                                  

- Immunization history:: Flu vaccine is not up to date.                                           

- Social history:: Smoking status: Patient/guardian denies using tobacco.                         

- Ebola Screening: : Patient denies travel to an Ebola-affected area in the 21 days               

  before illness onset.                                                                           

                                                                                                  

                                                                                                  

ROS:                                                                                              

20:16 Constitutional: Negative for fever and weight loss, + chills Eyes: Negative for injury, snw 

      pain, redness, and discharge, ENT: Negative for injury, pain, and discharge, Neck:          

      Negative for injury, pain, and swelling, Cardiovascular: Negative for chest pain,           

      palpitations, and edema.                                                                    

20:16 Back: Negative for injury and pain, : Negative for injury, bleeding, discharge, and       

      swelling, MS/Extremity: Negative for injury and deformity, + lower extremity edema          

      Neuro: Negative for headache, weakness, numbness, tingling, and seizure.                    

20:16 Respiratory: Positive for cough, with no reported sputum.                                   

20:16 Skin: Positive for lower extremities with mild erythema, edema.                             

                                                                                                  

Exam:                                                                                             

20:14 Head/Face:  Normocephalic, atraumatic. Eyes:  Pupils equal round and reactive to light, snw 

      extra-ocular motions intact.  Lids and lashes normal.  Conjunctiva and sclera are           

      non-icteric and not injected.  Cornea within normal limits.  Periorbital areas with no      

      swelling, redness, or edema. ENT:  Nares patent. No nasal discharge, no septal              

      abnormalities noted.  Tympanic membranes are normal and external auditory canals are        

      clear.  Oropharynx with no redness, swelling, or masses, exudates, or evidence of           

      obstruction, uvula midline.  Mucous membranes moist. Neck:  Trachea midline, no             

      thyromegaly or masses palpated, and no cervical lymphadenopathy.  Supple, full range of     

      motion without nuchal rigidity, or vertebral point tenderness.  No Meningismus.             

      Chest/axilla:  Normal chest wall appearance and motion.  Nontender with no deformity.       

      No lesions are appreciated. Cardiovascular:  Regular rate and rhythm with a normal S1       

      and S2.  No gallops, murmurs, or rubs.  Normal PMI, no JVD.  No pulse deficits.             

      Abdomen/GI:  Soft, non-tender, with normal bowel sounds.  No distension or tympany.  No     

      guarding or rebound.  No evidence of tenderness throughout.                                 

20:14 Back:  No spinal tenderness.  No costovertebral tenderness.  Full range of motion. MS/      

      Extremity:  Pulses equal, no cyanosis.  Neurovascular intact.  Full, normal range of        

      motion. Neuro:  Awake and alert, GCS 15, oriented to person, place, time, and               

      situation.  Cranial nerves II-XII grossly intact.  Motor strength 5/5 in all                

      extremities.  Sensory grossly intact.  Cerebellar exam normal.  Normal gait.                

20:14 Constitutional: The patient appears alert, awake, febrile.                                  

20:14 Respiratory: the patient does not display signs of respiratory distress,  Respirations:     

      shallow respirations, Breath sounds: bronchial sounds, wheezin:14 Skin: Appearance: normal except for affected area, lower extremities with peeling, mild     

      erythema, mild edema, DVT study negative.                                                   

                                                                                                  

Vital Signs:                                                                                      

17:15  / 92; Pulse 98; Resp 20; Temp 98.1; Pulse Ox 98% on R/A; Weight 80.74 kg (R);    aj1 

      Height 5 ft. 7 in. (170.18 cm) (R); Pain 0/10;                                              

19:27  / 86; Pulse 95; Resp 17; Temp 98.7; Pulse Ox 99% ;                               rr5 

20:00  / 79; Pulse 83; Resp 20; Pulse Ox 98% ;                                          rr5 

21:00  / 89; Pulse 86; Resp 18; Pulse Ox 98% ;                                          rr5 

22:00  / 70; Pulse 80; Resp 19; Pulse Ox 98% ;                                          rr5 

22:50  / 70; Pulse 83; Resp 20; Temp 98.8; Pulse Ox 96% ;                               rr5 

17:15 Body Mass Index 27.88 (80.74 kg, 170.18 cm)                                             aj1 

                                                                                                  

MDM:                                                                                              

18:27 Patient medically screened.                                                             snw 

21:32 Data reviewed: vital signs, nurses notes. Data interpreted: Pulse oximetry: on room air snw 

      is 99 %. Interpretation: normal. Counseling: I had a detailed discussion with the           

      patient and/or guardian regarding: the historical points, exam findings, and any            

      diagnostic results supporting the discharge/admit diagnosis, the presence of at least       

      one elevated blood pressure reading (>120/80) during this emergency department visit,       

      lab results, radiology results, the need for further work-up and treatment in the           

      hospital. Physician consultation: Lucas Coronado DO was called at 21:32, was contacted       

      at 21:32, regarding admission, in the emergency department to see patient at 21:32.         

                                                                                                  

                                                                                             

18:29 Order name: Urine Culture                                                               snw 

                                                                                             

18:29 Order name: Basic Metabolic Panel; Complete Time: 19:58                                 snw 

                                                                                             

18:29 Order name: Blood Culture Adult (2)                                                     snw 

                                                                                             

18:29 Order name: CBC with Diff; Complete Time: 19:47                                         snw 

                                                                                             

18:29 Order name: Ckmb; Complete Time: 19:58                                                  snw 

                                                                                             

18:29 Order name: CPK; Complete Time: 19:58                                                   snw 

                                                                                             

18:29 Order name: Lactate; Complete Time: 19:58                                               snw 

                                                                                             

18:29 Order name: LFT's; Complete Time: 19:58                                                 snw 

                                                                                             

18:29 Order name: Lipase; Complete Time: 19:58                                                snw 

                                                                                             

18:29 Order name: Procalcitonin; Complete Time: 20:21                                         snw 

                                                                                             

18:29 Order name: Protime (+inr); Complete Time: 19:47                                        snw 

                                                                                             

18:29 Order name: Ptt, Activated; Complete Time: 19:47                                        snw 

                                                                                             

18:29 Order name: Troponin (emerg Dept Use Only); Complete Time: 19:58                        snw 

                                                                                             

18:29 Order name: Urine Microscopic Only                                                      snw 

                                                                                             

18:29 Order name: Chest Single View XRAY; Complete Time: 19:11                                snw 

                                                                                             

18:29 Order name: Accucheck; Complete Time: 19:25                                             snw 

                                                                                             

18:29 Order name: Cardiac monitoring; Complete Time: 19:21                                    snw 

                                                                                             

18:29 Order name: EKG - Nurse/Tech; Complete Time: 19:20                                      snw 

                                                                                             

18:29 Order name: IV Saline Lock - Large Bore; Complete Time: 19:20                           snw 

                                                                                             

18:29 Order name: Labs collected and sent; Complete Time: 19:20                               snw 

                                                                                             

18:29 Order name: O2 Per Protocol; Complete Time: 19:20                                       snw 

                                                                                             

18:29 Order name: O2 Sat Monitoring; Complete Time: 19:20                                     snw 

                                                                                             

18:29 Order name: Urine Dipstick-Ancillary (obtain specimen); Complete Time: 23:16            snw 

                                                                                             

18:54 Order name: US Extremity Venous W Compression Humphrey; Complete Time: 21:58                 snw 

                                                                                             

19:28 Order name: Glucose, Ancillary Testing; Complete Time: 19:47                            EDMS

                                                                                             

20:14 Order name: Misc. Order: Please increase IVF rate to bolus; Complete Time: 20:20        snw 

                                                                                                  

Administered Medications:                                                                         

19:21 Drug: NS 0.9% 1000 ml Route: IV; Rate: 125 ml/hr; Site: right antecubital;              rr5 

20:34 Follow up: Rate change bolus                                                            rr5 

21:30 Follow up: Response: No adverse reaction; IV Status: Completed infusion; IV Intake:     rr5 

      1000ml                                                                                      

20:05 Drug: Rocephin 1 grams Route: IV; Rate: calculated rate; Site: right antecubital;       rr5 

20:35 Follow up: Response: No adverse reaction; IV Status: Completed infusion; IV Intake: [id number] 

20:33 Drug: LevaQUIN 500 mg Volume: 100 ml; Route: IVPB; Infused Over: 60 mins; Site: right   rr5 

      antecubital;                                                                                

21:30 Follow up: Response: No adverse reaction; IV Status: Completed infusion; IV Intake:     rr5 

      100ml                                                                                       

                                                                                                  

                                                                                                  

Point of Care Testing:                                                                            

      Blood Glucose:                                                                              

19:25 Blood Glucose: 143 mg/dL;                                                               jp3 

      Ranges:                                                                                     

      Critical Glucose Levels:Adult <50 mg/dl or >400 mg/dl  <40 mg/dl or >180 mg/dl       

Disposition:                                                                                      

                                                                                             

07:13 Co-signature as Attending Physician, Claus Yost MD.                                    rn  

                                                                                                  

Disposition:                                                                                      

19 21:32 Hospitalization ordered by Lucas Coronado for Observation. Preliminary             

  diagnosis are Pneumonia, unspecified organism, Diabetes mellitus due to                         

  underlying condition, Renal insufficiency.                                                      

- Bed requested for Telemetry/MedSurg (observation).                                              

- Status is Observation.                                                                      rr5 

- Condition is Stable.                                                                            

- Problem is new.                                                                                 

- Symptoms are unchanged.                                                                         

UTI on Admission? No                                                                              

                                                                                                  

                                                                                                  

                                                                                                  

Signatures:                                                                                       

Dispatcher [employer]                           EDMS                                                 

Charissa Yadav RN                     RN   aj1                                                  

Jeni Parson, ZION-C                 FNP-Csnw                                                  

Claus Yost MD MD rn Garcia, Cindy, RN RN cg Roque, Raymond, RN                      RN   rr5                                                  

                                                                                                  

Corrections: (The following items were deleted from the chart)                                    

                                                                                             

22:27 21:32 Hospitalization Ordered by Lucas Coronado DO for Observation. Preliminary          cg  

      diagnosis is Pneumonia, unspecified organism; Diabetes mellitus due to underlying           

      condition; Renal insufficiency. Bed requested for Telemetry/MedSurg (observation).          

      Status is Observation. Condition is Stable. Problem is new. Symptoms are unchanged. UTI     

      on Admission? No. snw                                                                       

22:40 22:27 2019 21:32 Hospitalization Ordered by Lucas Coronado DO for Observation.     cg  

      Preliminary diagnosis is Pneumonia, unspecified organism; Diabetes mellitus due to          

      underlying condition; Renal insufficiency. Bed requested for Telemetry/MedSurg              

      (observation). Status is Observation. Condition is Stable. Problem is new. Symptoms are     

      unchanged. UTI on Admission? No. cg                                                         

23:17 22:40 2019 21:32 Hospitalization Ordered by Lucas Coronado DO for Observation.     rr5 

      Preliminary diagnosis is Pneumonia, unspecified organism; Diabetes mellitus due to          

      underlying condition; Renal insufficiency. Bed requested for Telemetry/MedSurg              

      (observation). Status is Observation. Condition is Stable. Problem is new. Symptoms are     

      unchanged. UTI on Admission? No. cg                                                         

                                                                                                  

**************************************************************************************************

## 2019-09-08 NOTE — ER
Nurse's Notes                                                                                     

 [facility]                                                                 

Name: Gregory Garvin                                                                            

Age: 59 yrs                                                                                       

Sex: Male                                                                                         

: 1960                                                                                   

MRN: [id number]                                                                                   

Arrival Date: 2019                                                                          

Time: 16:42                                                                                       

Account#: [id number]                                                                            

Bed 20                                                                                            

Private MD:                                                                                       

Diagnosis: Pneumonia, unspecified organism;Diabetes mellitus due to underlying condition;Renal    

  insufficiency                                                                                   

                                                                                                  

Presentation:                                                                                     

                                                                                             

17:10 Presenting complaint: Patient states: He was seen at a clinic he's been having swelling aj1 

      in his feet and he has been running fever since last Saturday. Reports that he has been     

      feeling unwell for the past 2 weeks. Denies shortness of breath. Denies chest pain.         

      Transition of care: patient was not received from another setting of care. Onset of         

      symptoms was 2019. Risk Assessment: Do you want to hurt yourself or           

      someone else? Patient reports no desire to harm self or others. Initial Sepsis Screen:      

      Does the patient meet any 2 criteria? HR > 90 bpm. No. Patient's initial sepsis screen      

      is negative. Does the patient have a suspected source of infection? No. Patient's           

      initial sepsis screen is negative. Care prior to arrival: None.                             

17:10 Method Of Arrival: Ambulatory                                                           aj1 

17:10 Acuity: MARTIN 3                                                                           aj1 

                                                                                                  

Triage Assessment:                                                                                

17:15 General: Appears in no apparent distress. comfortable, Behavior is calm, cooperative,   aj1 

      appropriate for age. Pain: Denies pain. Neuro: Level of Consciousness is awake, alert,      

      obeys commands. Cardiovascular: Patient's skin is warm and dry. Respiratory: Airway is      

      patent Respiratory effort is even, unlabored, Respiratory pattern is regular,               

      symmetrical.                                                                                

                                                                                                  

Historical:                                                                                       

- Allergies:                                                                                      

17:15 NKA;                                                                                    aj1 

- Home Meds:                                                                                      

17:15 Metformin Oral [Active]; Lisinopril Oral [Active];                                      aj1 

- PMHx:                                                                                           

17:15 Asthma; Diabetes - NIDDM; Hypertension;                                                 aj1 

                                                                                                  

- Immunization history:: Flu vaccine is not up to date.                                           

- Social history:: Smoking status: Patient/guardian denies using tobacco.                         

- Ebola Screening: : Patient denies travel to an Ebola-affected area in the 21 days               

  before illness onset.                                                                           

                                                                                                  

                                                                                                  

Screenin:33 Abuse screen: Denies threats or abuse. Nutritional screening: No deficits noted.        em  

      Tuberculosis screening: No symptoms or risk factors identified. Fall Risk None              

      identified.                                                                                 

                                                                                                  

Assessment:                                                                                       

18:33 General: Appears in no apparent distress. comfortable, Behavior is calm, cooperative,   em  

      Reports fever for > 3 days. Pain: Denies pain. Neuro: Level of Consciousness is awake,      

      alert, obeys commands, Oriented to person, place, time, situation, Appropriate for age.     

      Cardiovascular: Denies chest pain, shortness of breath, Capillary refill < 3 seconds        

      Patient's skin is warm and dry. Respiratory: Airway is patent Respiratory effort is         

      even, unlabored, Respiratory pattern is regular, symmetrical. GI: Abdomen is flat,          

      Reports nausea, vomiting. Derm: Skin is intact, is healthy with good turgor, Skin is        

      pink, warm \T\ dry. Musculoskeletal: Capillary refill < 3 seconds, Range of motion:         

      intact in all extremities.                                                                  

18:40 Reassessment: The previous assessment is accurate. Call light remains within reach.     ss  

19:28 General: Appears in no apparent distress. comfortable, Behavior is calm, cooperative,   rr5 

      appropriate for age, Reports fever for > 3 days, nor feeling well. Pain: Denies pain.       

      Neuro: Level of Consciousness is awake, alert, obeys commands, Oriented to person,          

      place, time, situation, Appropriate for age. Cardiovascular: Denies chest pain,             

      shortness of breath, Capillary refill < 3 seconds Patient's skin is warm and dry.           

      Respiratory: Airway is patent Respiratory effort is even, unlabored, Respiratory            

      pattern is regular, symmetrical. GI: Abdomen is round. : No signs and/or symptoms         

      were reported regarding the genitourinary system. EENT: No signs and/or symptoms were       

      reported regarding the EENT system. Derm: Skin is healthy with good turgor, Skin is         

      pink, warm \T\ dry. Musculoskeletal: Capillary refill < 3 seconds, Swelling present in      

      right leg and left leg.                                                                     

20:30 Reassessment: Patient appears in no apparent distress at this time. Patient and/or      rr5 

      family updated on plan of care and expected duration. Pain level reassessed. Patient is     

      alert, oriented x 3, equal unlabored respirations, skin warm/dry/pink. no complaints        

      made awaiting for results.                                                                  

21:30 Reassessment: Patient appears in no apparent distress at this time. Patient and/or      rr5 

      family updated on plan of care and expected duration. Pain level reassessed. Patient is     

      alert, oriented x 3, equal unlabored respirations, skin warm/dry/pink.                      

21:50 Reassessment: Patient appears in no apparent distress at this time. Patient is alert,   rr5 

      oriented x 3, equal unlabored respirations, skin warm/dry/pink. seen and examined by        

      dr. hargrove for admission. Patient denies pain at this time. Patient states feeling          

      better. Patient states symptoms have improved.                                              

22:50 Reassessment: Patient appears in no apparent distress at this time. Patient is alert,   rr5 

      oriented x 3, equal unlabored respirations, skin warm/dry/pink. endorsed to 4 th floor      

      accepted the case. Patient denies pain at this time. Patient states symptoms have           

      improved.                                                                                   

                                                                                                  

Vital Signs:                                                                                      

17:15  / 92; Pulse 98; Resp 20; Temp 98.1; Pulse Ox 98% on R/A; Weight 80.74 kg (R);    aj1 

      Height 5 ft. 7 in. (170.18 cm) (R); Pain 0/10;                                              

19:27  / 86; Pulse 95; Resp 17; Temp 98.7; Pulse Ox 99% ;                               rr5 

20:00  / 79; Pulse 83; Resp 20; Pulse Ox 98% ;                                          rr5 

21:00  / 89; Pulse 86; Resp 18; Pulse Ox 98% ;                                          rr5 

22:00  / 70; Pulse 80; Resp 19; Pulse Ox 98% ;                                          rr5 

22:50  / 70; Pulse 83; Resp 20; Temp 98.8; Pulse Ox 96% ;                               rr5 

17:15 Body Mass Index 27.88 (80.74 kg, 170.18 cm)                                             aj1 

                                                                                                  

ED Course:                                                                                        

16:42 Patient arrived in ED.                                                                  as  

17:13 Triage completed.                                                                       aj1 

17:15 Arm band placed on Patient placed in waiting room, Patient notified of wait time.       aj1 

18:13 Zack Hope LVN is Primary Nurse.                                                     em  

18:27 Jeni Parson FNP-C is PHCP.                                                        snw 

18:27 Claus Yost MD is Attending Physician.                                                snw 

18:33 Patient has correct armband on for positive identification. Placed in gown. Bed in low  em  

      position. Call light in reach. Cardiac monitor on. Pulse ox on. NIBP on.                    

18:44 Chest Single View XRAY In Process Unspecified.                                          EDMS

18:55 Initial lab(s) drawn, by me, sent to lab. First set of blood cultures drawn by me.      [id number] 

      Inserted saline lock: 22 gauge in right antecubital area, using aseptic technique.          

      Blood collected.                                                                            

19:12 Second set of blood cultures drawn by me, EKG done, by ED staff, reviewed by Jeni BHATT.                                                                             

19:18 Patient maintains SpO2 saturation greater than 95% on room air.                         jp3 

19:54 Ultrasound completed. Patient tolerated well. Notified NP/PA jeni.                    sg3 

21:17 US Extremity Venous W Compression Humphrey In Process Unspecified.                           EDMS

21:30 Lucas Hargrove DO is Hospitalizing Provider.                                           snw 

22:54 No provider procedures requiring assistance completed. Patient admitted, IV remains in  rr5 

      place. intact, No redness/swelling at site.                                                 

                                                                                                  

Administered Medications:                                                                         

19:21 Drug: NS 0.9% 1000 ml Route: IV; Rate: 125 ml/hr; Site: right antecubital;              rr5 

20:34 Follow up: Rate change bolus                                                            rr5 

21:30 Follow up: Response: No adverse reaction; IV Status: Completed infusion; IV Intake:     rr5 

      1000ml                                                                                      

20:05 Drug: Rocephin 1 grams Route: IV; Rate: calculated rate; Site: right antecubital;       rr5 

20:35 Follow up: Response: No adverse reaction; IV Status: Completed infusion; IV Intake: [id number] 

20:33 Drug: LevaQUIN 500 mg Volume: 100 ml; Route: IVPB; Infused Over: 60 mins; Site: right   rr5 

      antecubital;                                                                                

21:30 Follow up: Response: No adverse reaction; IV Status: Completed infusion; IV Intake:     rr5 

      100ml                                                                                       

                                                                                                  

                                                                                                  

Point of Care Testing:                                                                            

      Blood Glucose:                                                                              

19:25 Blood Glucose: 143 mg/dL;                                                               jp3 

      Ranges:                                                                                     

                                                                                                  

Intake:                                                                                           

20:35 IV: 10ml; Total: 10ml.                                                                  rr5 

21:30 IV: 100ml; Total: 110ml.                                                                rr5 

21:30 IV: 1000ml; Total: 1110ml.                                                              rr5 

                                                                                                  

Outcome:                                                                                          

21:32 Decision to Hospitalize by Provider.                                                    snw 

22:53 Admitted to Tele accompanied by tech, via wheelchair, room 414, with chart, Report      rr5 

      called to  irineo                                                                            

22:53 Condition: stable                                                                           

22:53 Instructed on the need for admit.                                                           

23:17 Patient left the ED.                                                                    rr5 

                                                                                                  

Signatures:                                                                                       

Dispatcher [employer]                           Charissa Lopez, RN                     RN   aj1                                                  

Jeni Parson, FNP-C                 FNP-Csnw                                                  

Zack Hope, BRADYN                       LVN  Shanna Kearney Shelby, RN                      RN   ss                                                   

Araceli Flores                               sg3                                                  

Boris Sutherland                              jp3                                                  

Ron Romo RN                      RN   rr5                                                  

                                                                                                  

**************************************************************************************************

## 2019-09-08 NOTE — RAD REPORT
EXAM DESCRIPTION:  RAD - Chest Single View - 9/8/2019 6:44 pm

 

CLINICAL HISTORY:  FEVER

Chest pain.

 

COMPARISON:  Chest Single View dated 12/29/2017; Chest Pa And Lat (2 Views) dated 10/28/2017Chest Sin
gle View dated 12/29/2017; Chest Pa And Lat (2 Views) dated 10/28/2017

 

FINDINGS:  Portable technique limits examination quality.

 

Early/vague infiltrate is suspected in the right mid lung and right lung base compatible with pneumon
ia. The heart is mildly to moderately enlarged. The left lung is grossly clear.

## 2019-09-08 NOTE — XMS REPORT
Clinical Summary

 Created on:2019



Patient:Gregory Garvin

Sex:Male

:1960

External Reference #:[id number]





Demographics







 Address  [address]



   [place] [address]

 

 Home Phone  [phone]

 

 Preferred Language  English

 

 Marital Status  Unknown

 

 Alevism Affiliation  Unknown

 

 Race  White

 

 Ethnic Group   or 









Author







 Organization  [facility]

 

 Address  [address] [place] [address]









Support







 Name  Relationship  Address  Phone

 

 Herber Valle  Unavailable  Unavailable  [phone]

 

 Kitty Reid  Unavailable  Unavailable  Unavailable









Care Team Providers







 Name  Role  Phone

 

 Unavailable  Primary Care Provider  Unavailable









Allergies

No Known Allergies



Medications







 Medication  Sig  Dispensed  Refills  Start Date  End Date  Status

 

 metFORMIN  Take by mouth 2    0      Active



 (GLUCOPHAGE) 500 MG  (two) times daily          



 tabletIndications:  with breakfast          



 type 2 diabetes  and dinner Pt          



 mellitus  does not know          



   what dose .          

 

 aspirin 81 MG  Take 1 tablet (81  90 tablet  0  2018  



 chewable tablet  mg total) by          



   mouth daily.          

 

 carvedilol (COREG)  Take 1 tablet  180 tablet  1  2018  




 3.125 MG tablet  (3.125 mg total)          



   by mouth 2 (two)          



   times daily.          

 

 digoxin (LANOXIN)  Take 1 tablet  90 tablet  1  2018  



 0.125 MG tablet  (125 mcg total)          



   by mouth daily.          

 

 furosemide (LASIX)  Take 1 tablet (20  90 tablet  1  2018  




 20 MG tablet  mg total) by          



   mouth daily.          







Active Problems







 Problem  Noted Date

 

 Congestive heart failure (CHF)  2017







Social History







 Tobacco Use  Types  Packs/Day  Years Used  Date

 

 Never Smoker        









 Smokeless Tobacco: Never Used      









 Sex Assigned at Birth  Date Recorded

 

 Not on file  









 Job Start Date  Occupation  Industry

 

 Not on file  Not on file  Not on file









 Travel History  Travel Start  Travel End









 No recent travel history available.







Last Filed Vital Signs

Not on file



Plan of Treatment

Not on file



Implants







 Implanted  Type  Area    Device  Shelf  Model /



         Identifier  Expiration  Serial /



           Date  Lot

 

 Closure Sys Perclose Progl 6fr [id number] - [id number]  Cardiovascular  N/A:  
RITCHIE  [id number]  2019  00314-85 /



 Implanted: Qty: 1 on 2018 by Alexandra Pierson MD Groin  LAB:[facility]   
    /







Procedures







 Procedure Name  Priority  Date/Time  Associated Diagnosis  Comments

 

 RHYTHM STRIP - SCAN    2019 12:50 PM CDT    



after 2018



Results

RHYTHM STRIP - SCAN (2019 12:50 PM CDT)





 Narrative  Performed At

 

 This result has an attachment that is not available.



  



after 2018



Advance Directives

For more information, please contact:[address] [phone]





 Code Status  Date Activated  Date Inactivated  Comments

 

 Full Code  2017  6:51 PM  2018 10:14 PM  









 This code status was determined by:  Patient

## 2019-09-08 NOTE — P.HP
Certification for Inpatient


Patient admitted to: Observation


With expected LOS: <2 Midnights


Patient will require the following post-hospital care: None


Practitioner: I am a practitioner with admitting privileges, knowledge of 

patient current condition, hospital course, and medical plan of care.


Services: Services provided to patient in accordance with Admission 

requirements found in Title 42 Section 412.3 of the Code of Federal Regulations





Patient History


Date of Service: 09/08/19


Primary Care Provider: Wes Bautista in [place], [place]


Reason for admission: Fever, fatigue, shortness of breath


History of Present Illness: 





59-year-old  male presented to the emergency room with fever, fatigue 

and shortness of breath.





Patient with history of diabetes and hypertension.  The patient reports that he 

was seen by his PCP recently.  It was noted that he had a fever.  There were 

concerned for infection.  Patient also had cough, shortness of breath.  There 

was also some mention of edema to the lower extremity.  Fatigue was noted. He 

has had a mild cough with congestion. No sick contacts noted. He came to the ER 

as recommended by a PCP.





In the ER patient evaluated.  Vital signs stable.  White count 8.3, hemoglobin 

stable.  Platelet count of 413. Creatinine 1.67 with a GFR 47.  Glucose 138. 

Lactic acid within normal range but pro calcitonin elevated at 15.5.  Venous 

Doppler of the lower extremity negative.  Chest x-ray showed right middle to 

lower lobe pneumonia.  Patient was given IV fluids and antibiotic therapy in 

the emergency room.  He was admitted for further evaluation and treatment.





When I saw the patient the ER, he appeared comfortable.  He did not look in any 

respiratory distress.


Allergies





No Known Allergies Allergy (Verified 12/29/17 09:23)


 





Home medications list reviewed: Yes


Home Medications: 








Metformin HCl [Metformin ER Osmotic] 500 mg PO DAILY 12/29/17 


Enoxaparin Sodium [Lovenox 40 MG INJ*] 40 mg SQ DAILY 5 PM  syr 12/31/17 


Furosemide [Lasix 40 MG INJ*] 40 mg IV BIDL  vial 12/31/17 


Insulin -Regular Human [Novolin -R*] See Protocol SQ ACHS  ml 12/31/17 


Lisinopril [Prinivil*] 10 mg PO DAILY  tab 12/31/17 


Metoprolol Tartrate [Lopressor*] 12.5 mg PO BID 6AM 6PM  tab 12/31/17 


Spironolactone [Aldactone*] 25 mg PO DAILY  tab 12/31/17 








- Past Medical/Surgical History


Diabetic: Yes


-: Diabetes mellitus type 2, non-insulin dependent


-: Hypertension


Past Surgical History: Patient denies surgical history


Psychosocial/ Personal History: Patient is 





- Family History


Family History: Reviewed- Non-Contributory





- Social History


Smoking Status: Never smoker


Alcohol use: Yes


CD- Drugs: No


Caffeine use: Yes


Place of Residence: Home





Review of Systems


General: Fever, Weakness, As per HPI


Eyes: Unremarkable


ENT: Nose Congestion, As per HPI


Respiratory: Cough, Shortness of Breath, As per HPI


Cardiovascular: Unremarkable


Gastrointestinal: Unremarkable


Genitourinary: Unremarkable


Musculoskeletal: Pedal edema, As per HPI


Neurological: Unremarkable


Lymphatics: Unremarkable





Physical Examination





- Physical Exam


General: Alert, In no apparent distress, Oriented x3, Cooperative


HEENT: Atraumatic, Normocephalic, PERRLA, Mucous membr. moist/pink


Neck: Supple, No Thyromegaly


Respiratory: Crackles/rales (Crackles to the right base but good air movement)


Cardiovascular: Normal pulses, Regular rate/rhythm


Gastrointestinal: Normal bowel sounds, Soft and benign, Non-distended, No 

tenderness, No masses, No rebound, No guarding


Musculoskeletal: No erythema, No tenderness, No warmth


Integumentary: No tenderness/swelling, No erythema, No warmth, No cyanosis


Neurological: Normal speech, Normal strength at 5/5 x4 extr, Normal tone, 

Normal affect





- Studies


Laboratory Data (last 24 hrs)





09/08/19 18:55: PT 13.4 H, INR 1.14, APTT 33.9


09/08/19 18:55: WBC 8.3, Hgb 14.0, Hct 39.9, Plt Count 413 H


09/08/19 18:55: Sodium 144, Potassium 3.7, BUN 9, Creatinine 1.67 H, Glucose 

138 H, Total Bilirubin 0.6, AST 16, ALT 26, Alkaline Phosphatase 92, Lipase 65 L








Assessment and Plan





- Plan





Impression:


Shortness of breath secondary to right middle and lower lobe pneumonia doubt 

sepsis


Acute renal injury likely related to medication


Hypertension


Diabetes mellitus type 2, non-insulin-dependent





Plan:


Shortness of breath secondary to right middle and lower lobe pneumonia doubt 

sepsis:  Patient will be admitted for further evaluation and treatment.  IV 

antibiotic therapy initiated.  Patient on Rocephin and Zithromax.  Will provide 

medication for cough, congestion. Will provide medication for shortness of 

breath.  Will maintain sats above 90%.  Wean off oxygen over the next 12-24 hr.

  Recheck chest surgery tomorrow.  Will check echocardiogram to further 

evaluate.  DVT prophylaxis-Lovenox initiated.  Patient will get IV fluids due 

to acute renal injury.  Recheck pro calcitonin tomorrow.  I will turn the 

service over to Dr. Mcginnis tomorrow.  I will go over the plan of care with him.  

Anticipate possible discharge tomorrow if clinically improved.


Acute renal injury likely related to medication:  Will hold lisinopril/

hydrochlorothiazide.  Will continue with IV fluids.  Will check renal 

ultrasound.  Will monitor renal function.  May need to make adjustments of his 

blood pressure medication at discharge.


Hypertension:  Will discontinue lisinopril hydrochlorothiazide due to acute 

renal failure.  Medication may need to be adjusted at discharge. Will provide 

IV medication as needed.


Diabetes mellitus type 2, non-insulin-dependent:  Will check A1c.  Will monitor 

Accu-Cheks and provide sliding scale.


Discharge Plan: Home


Plan to discharge in: 48 Hours





- Advance Directives


Does patient have a Living Will: No


Does patient have a Durable POA for Healthcare: No





- Code Status/Comfort Care


Code Status Assessed: Yes (Patient is full code)


Time Spent Managing Pts Care (In Minutes): 55

## 2019-09-09 VITALS — OXYGEN SATURATION: 95 %

## 2019-09-09 LAB
BUN BLD-MCNC: 9 MG/DL (ref 7–18)
GLUCOSE SERPLBLD-MCNC: 96 MG/DL (ref 74–106)
HCT VFR BLD CALC: 33.1 % (ref 39.6–49)
LYMPHOCYTES # SPEC AUTO: 1.2 K/UL (ref 0.7–4.9)
MAGNESIUM SERPL-MCNC: 1.6 MG/DL (ref 1.8–2.4)
PMV BLD: 7.8 FL (ref 7.6–11.3)
POTASSIUM SERPL-SCNC: 3.5 MMOL/L (ref 3.5–5.1)
RBC # BLD: 3.61 M/UL (ref 4.33–5.43)
UA DIPSTICK W REFLEX MICRO PNL UR: (no result)

## 2019-09-09 RX ADMIN — SODIUM CHLORIDE SCH MLS: 0.9 INJECTION, SOLUTION INTRAVENOUS at 10:21

## 2019-09-09 RX ADMIN — HUMAN INSULIN SCH: 100 INJECTION, SOLUTION SUBCUTANEOUS at 11:30

## 2019-09-09 RX ADMIN — HUMAN INSULIN SCH: 100 INJECTION, SOLUTION SUBCUTANEOUS at 07:30

## 2019-09-09 RX ADMIN — FAMOTIDINE SCH MG: 20 TABLET, FILM COATED ORAL at 19:59

## 2019-09-09 RX ADMIN — HUMAN INSULIN SCH: 100 INJECTION, SOLUTION SUBCUTANEOUS at 16:30

## 2019-09-09 RX ADMIN — CARVEDILOL SCH MG: 3.12 TABLET, FILM COATED ORAL at 17:40

## 2019-09-09 RX ADMIN — CEFTRIAXONE SCH MLS: 1 INJECTION, SOLUTION INTRAVENOUS at 19:58

## 2019-09-09 RX ADMIN — ENOXAPARIN SODIUM SCH MG: 40 INJECTION SUBCUTANEOUS at 08:07

## 2019-09-09 RX ADMIN — HUMAN INSULIN SCH: 100 INJECTION, SOLUTION SUBCUTANEOUS at 21:00

## 2019-09-09 RX ADMIN — Medication SCH ML: at 19:58

## 2019-09-09 RX ADMIN — SODIUM CHLORIDE SCH MLS: 0.45 INJECTION, SOLUTION INTRAVENOUS at 17:41

## 2019-09-09 RX ADMIN — SODIUM CHLORIDE SCH MLS: 0.45 INJECTION, SOLUTION INTRAVENOUS at 23:58

## 2019-09-09 RX ADMIN — FAMOTIDINE SCH MG: 20 TABLET, FILM COATED ORAL at 08:08

## 2019-09-09 RX ADMIN — CEFTRIAXONE SCH MLS: 1 INJECTION, SOLUTION INTRAVENOUS at 08:08

## 2019-09-09 RX ADMIN — SODIUM CHLORIDE SCH MLS: 0.45 INJECTION, SOLUTION INTRAVENOUS at 10:20

## 2019-09-09 RX ADMIN — Medication SCH ML: at 08:08

## 2019-09-09 NOTE — P.DS
Admission Date: 09/08/19


Discharge Date: 09/09/19


Primary Care Provider: Wes Bautista in [place], [place]


Disposition: ROUTINE DISCHARGE


Discharge Condition: GOOD


Reason for Admission: Fever, fatigue, shortness of breath


Consultations: 





None





Procedures: 





CXR: 


FINDINGS:  Portable technique limits examination quality. 


Early/vague infiltrate is suspected in the right mid lung and right lung base 

compatible with pneumonia. The heart is mildly to moderately enlarged. The left 

lung is grossly clear.





Follow up CXR: 





ECHO: 





Medical Problem List:


Shortness of breath secondary to right middle and lower lobe pneumonia 


Acute renal injury likely related to medication


Hypertension


Diabetes mellitus type 2, non-insulin-dependent





Brief History of Present Illness: 





59-year-old  male presented to the emergency room with fever, fatigue 

and shortness of breath.





Patient with history of diabetes and hypertension.  The patient reports that he 

was seen by his PCP recently.  It was noted that he had a fever.  There were 

concerned for infection.  Patient also had cough, shortness of breath.  There 

was also some mention of edema to the lower extremity.  Fatigue was noted. He 

has had a mild cough with congestion. No sick contacts noted. He came to the ER 

as recommended by a PCP.





In the ER patient evaluated.  Vital signs stable.  White count 8.3, hemoglobin 

stable.  Platelet count of 413. Creatinine 1.67 with a GFR 47.  Glucose 138. 

Lactic acid within normal range but pro calcitonin elevated at 15.5.  Venous 

Doppler of the lower extremity negative.  Chest x-ray showed right middle to 

lower lobe pneumonia.  Patient was given IV fluids and antibiotic therapy in 

the emergency room.  He was admitted for further evaluation and treatment.





When I saw the patient the ER, he appeared comfortable.  He did not look in any 

respiratory distress.


Hospital Course: 





Patient presented with shortness of breath.  He is found to have right middle 

and lower lobe pneumonia.  Lactic acid within normal range.  Pro calcitonin 

initially elevated but improved.  Patient was admitted for observation.  

Patient received IV antibiotic therapy.  Repeat chest x-ray showed stability.  

Patient received IV fluids as well.  At discharge oxygen saturations within 

normal range.  He is without any significant chest pain or shortness of breath.

  Strep test negative.  Influenza test negative.  Echocardiogram obtained.  At 

discharge patient will continue with Augmentin 500 mg 1 pill twice daily for 7 

days and add Zithromax 250 mg daily for 4 days.  Patient may continue with 

Mucinex 600 mg 1 pill twice daily as needed for congestion and Tessalon Perles 

100 mg 1 pill 3 times a day as needed for cough.  Patient will continue with 

incentive spirometer.  Patient will be provided albuterol 2 puffs 3 times a day 

as needed for shortness of breath.  Recommend follow up with PCP in 1-2 weeks 

to follow up this hospitalization.  Recommend to recheck chest x-ray in 2-4 

weeks to monitor resolution.





Patient had acute renal injury likely from dehydration and medication.  

Lisinopril hydrochlorothiazide was discontinued.  Patient received IV fluids.  

Renal function improved with fluid hydration.  Recommend to recheck  lab-BMP in 

1-2 weeks to monitor resolution.





Patient with hypertension.  Lisinopril hydrochlorothiazide discontinued due to 

acute renal failure.  Carvedilol added for blood pressure control.  At 

discharge he will continue with Coreg 6.25 mg 1 pill twice daily.  Recommend to 

maintain blood pressures less than 150/80.  Further adjustment can be done by 

his PCP.





Patient with diabetes mellitus type 2, non-insulin-dependent.  Blood sugars 

remained stable.  A1c obtained.  At discharge he may continue with Glucophage 

500 mg daily.  Recommend to maintain blood sugars less 140 fasting and less 

than 200 after meals.  Further adjustment can be done by his PCP.





Vital Signs/Physical Exam: 














Temp Pulse Resp BP Pulse Ox


 


 96.7 F L  86   16   156/81 H  98 


 


 09/08/19 23:27  09/08/19 23:27  09/08/19 23:27  09/08/19 23:27  09/08/19 23:27








General: Alert, In no apparent distress, Oriented x3, Cooperative


HEENT: Atraumatic


Neck: Supple


Respiratory: Clear to auscultation bilaterally, Normal air movement, Other (

Minimal crackles to the right base)


Cardiovascular: Normal pulses, Regular rate/rhythm


Gastrointestinal: Normal bowel sounds, Soft and benign, Non-distended, No 

tenderness, No masses, No rebound, No guarding


Musculoskeletal: No erythema, No tenderness, No warmth


Integumentary: No tenderness/swelling, No erythema, No warmth, No cyanosis


Neurological: Normal speech, Normal strength at 5/5 x4 extr, Normal tone, 

Normal affect


Laboratory Data at Discharge: 














WBC  8.3 K/uL (4.3-10.9)   09/08/19  18:55    


 


Hgb  14.0 g/dL (13.6-17.9)   09/08/19  18:55    


 


Hct  39.9 % (39.6-49.0)   09/08/19  18:55    


 


Plt Count  413 K/uL (152-406)  H  09/08/19  18:55    


 


PT  13.4 SECONDS (9.5-12.5)  H  09/08/19  18:55    


 


INR  1.14   09/08/19  18:55    


 


APTT  33.9 SECONDS (24.3-36.9)   09/08/19  18:55    


 


Sodium  144 mmol/L (136-145)   09/08/19  18:55    


 


Potassium  3.7 mmol/L (3.5-5.1)   09/08/19  18:55    


 


BUN  9 mg/dL (7-18)   09/08/19  18:55    


 


Creatinine  1.67 mg/dL (0.55-1.3)  H  09/08/19  18:55    


 


Glucose  138 mg/dL ()  H  09/08/19  18:55    


 


Total Bilirubin  0.6 mg/dL (0.2-1.0)   09/08/19  18:55    


 


AST  16 U/L (15-37)   09/08/19  18:55    


 


ALT  26 U/L (12-78)   09/08/19  18:55    


 


Alkaline Phosphatase  92 U/L ()   09/08/19  18:55    


 


Lipase  65 U/L ()  L  09/08/19  18:55    








Home Medications: 








Metformin HCl [Metformin ER Osmotic] 500 mg PO DAILY 12/29/17 


Albuterol Sulfate [Proair Hfa] 2 puff IH TID PRN #1 hfa.aer.ad 09/09/19 


Amoxicillin/Potassium Clav [Augmentin 500-125 Tablet] 1 each PO BID #14 tablet 

09/09/19 


Azithromycin Tab [Zithromax*] 250 mg PO DAILY #5 tab 09/09/19 


Benzonatate [Tessalon Perle*] 100 mg PO TID PRN #10 cap 09/09/19 


Carvedilol [Coreg] 6.25 mg PO BID #60 tab 09/09/19 


Guaifenesin [Mucinex] 600 mg PO BID PRN #10 tab.er.12h 09/09/19 





New Medications: 


Albuterol Sulfate [Proair Hfa] 2 puff IH TID PRN #1 hfa.aer.ad


 PRN Reason: Shortness Of Breath


Amoxicillin/Potassium Clav [Augmentin 500-125 Tablet] 1 each PO BID #14 tablet


Azithromycin Tab [Zithromax*] 250 mg PO DAILY #5 tab


Benzonatate [Tessalon Perle*] 100 mg PO TID PRN #10 cap


 PRN Reason: Cough


Carvedilol [Coreg] 6.25 mg PO BID #60 tab


Guaifenesin [Mucinex] 600 mg PO BID PRN #10 tab.er.12h


 PRN Reason: Cough


Patient Discharge Instructions: 1.  Recommend follow up with his PCP in 1 week 

to follow up this hospitalization.  2.  Patient presented with shortness of 

breath.  He is found to have right middle and lower lobe pneumonia.  Lactic 

acid within normal range.  Pro calcitonin initially elevated but improved with 

treatment.  Patient was admitted for observation.  Patient received IV 

antibiotic therapy.  Repeat chest x-ray showed stability.  Patient received IV 

fluids as well.  At discharge oxygen saturations within normal range.  He is 

without any significant chest pain or shortness of breath.  Strep test 

negative.  Influenza test negative.  Echocardiogram obtained.  At discharge 

patient will continue with Augmentin 500 mg 1 pill twice daily for 7 days and 

add Zithromax 250 mg daily for 4 days.  Patient may continue with Mucinex 600 

mg 1 pill twice daily as needed for congestion and Tessalon Perles 100 mg 1 

pill 3 times a day as needed for cough.  Patient will continue with incentive 

spirometer.  Patient will be provided albuterol 2 puffs 3 times a day as needed 

for shortness of breath.  Recommend follow up with PCP in 1-2 weeks to follow 

up this hospitalization.  Recommend to recheck chest x-ray in 2-4 weeks to 

monitor resolution.  3.  Patient had acute renal injury likely from dehydration 

and medication.  Lisinopril hydrochlorothiazide was discontinued.  Patient 

received IV fluids.  Renal function improved with fluid hydration.  Recommend 

to recheck  lab-BMP in 1-2 weeks to monitor resolution.  4.  Patient with 

hypertension.  Lisinopril hydrochlorothiazide discontinued due to acute renal 

failure.  Carvedilol added for blood pressure control.  At discharge he will 

continue with Carvedilol 6.25 mg 1 pill twice daily.  Recommend to maintain 

blood pressures less than 150/80.  Further adjustment can be done by his PCP.  

5.  Patient with diabetes mellitus type 2, non-insulin-dependent.  Blood sugars 

remained stable.  A1c obtained.  At discharge he may continue with Glucophage 

500 mg daily.  Recommend to maintain blood sugars less 140 fasting and less 

than 200 after meals.  Further adjustment can be done by his PCP.


Diet: ADA


Activity: Ad katiana


Time spent managing pt's care (in minutes): 55

## 2019-09-09 NOTE — RAD REPORT
EXAM DESCRIPTION:  RAD - Chest Pa And Lat (2 Views) - 9/9/2019 7:57 am

 

CLINICAL HISTORY:  Follow up chest x-ray, pneumonia

Chest pain.

 

COMPARISON:  Chest Single View dated 9/8/2019; Chest Single View dated 12/29/2017; Chest Pa And Lat (
2 Views) dated 10/28/2017

 

FINDINGS:  Since 09/08/2019, mild improvement is seen in right mid lung infiltrate. Loculated small r
ight pleural effusion noted. The heart is moderately enlarged in size. Multiple chronic wedge marci
romero deformities noted.

 

IMPRESSION:  Mild improvement is seen in right mid lung infiltrate since comparative study.

## 2019-09-09 NOTE — EKG
Test Date:    2019-09-08               Test Time:    18:59:01

Technician:   JOSE ANTONIO                                    

                                                     

MEASUREMENT RESULTS:                                       

Intervals:                                           

Rate:         94                                     

ID:           [id number]                                    

QRSD:         74                                     

QT:           360                                    

QTc:          450                                    

Axis:                                                

P:            26                                     

ID:           [id number]                                    

QRS:          -20                                    

T:            9                                      

                                                     

INTERPRETIVE STATEMENTS:                                       

                                                     

Normal sinus rhythm

Normal ECG

Compared to ECG 10/18/2018 11:05:35

T-wave abnormality no longer present

Prolonged QT interval no longer present



Electronically Signed On 09-09-19 16:55:22 CDT by Alvarez Parekh

## 2019-09-10 VITALS — DIASTOLIC BLOOD PRESSURE: 83 MMHG | TEMPERATURE: 97.2 F | SYSTOLIC BLOOD PRESSURE: 140 MMHG

## 2019-09-10 LAB
BUN BLD-MCNC: 6 MG/DL (ref 7–18)
GLUCOSE SERPLBLD-MCNC: 79 MG/DL (ref 74–106)
MAGNESIUM SERPL-MCNC: 1.5 MG/DL (ref 1.8–2.4)
POTASSIUM SERPL-SCNC: 3.4 MMOL/L (ref 3.5–5.1)

## 2019-09-10 RX ADMIN — ENOXAPARIN SODIUM SCH MG: 40 INJECTION SUBCUTANEOUS at 08:16

## 2019-09-10 RX ADMIN — FAMOTIDINE SCH MG: 20 TABLET, FILM COATED ORAL at 08:14

## 2019-09-10 RX ADMIN — CARVEDILOL SCH MG: 3.12 TABLET, FILM COATED ORAL at 06:20

## 2019-09-10 RX ADMIN — SODIUM CHLORIDE SCH MLS: 0.9 INJECTION, SOLUTION INTRAVENOUS at 09:49

## 2019-09-10 RX ADMIN — Medication SCH: at 08:18

## 2019-09-10 RX ADMIN — CEFTRIAXONE SCH MLS: 1 INJECTION, SOLUTION INTRAVENOUS at 08:17

## 2019-09-10 RX ADMIN — HUMAN INSULIN SCH: 100 INJECTION, SOLUTION SUBCUTANEOUS at 07:30

## 2019-09-10 NOTE — CON
Date of Consultation:  09/09/2019



Patient admitted to Dr. Coronado' service on 09/08/2019, I saw the patient on 09/09/2019.



Reason For Consultation:  Congestive heart failure.



History Of Present Illness:  Mr. Garvin is a 59-year-old Latin-American male.  He has diabetes, hyper
tension, asthma, ejection fraction of 19% in 2017, lost to follow up, comes in with fever.  Echocardi
ogram reconfirmed severe congestive heart failure.  The patient is now on carvedilol and antibiotics 
for possible pneumonia.  The patient actually denied have any CHF symptoms.  Denied PND, orthopnea, p
edal edema, palpitation, or syncope.  Denied any chest pain.



Allergies:  NONE.



Review of Systems:

Negative.



Social History:  Positive for alcohol.



Medications:  At home are supposed to be metformin, Coreg, and inhalers.



Family History:  Noncontributory.



Physical Examination:

General:  He was in normal sinus rhythm.  Normal blood pressure.  Afebrile. 

HEENT:  Negative. 

Neck:  Supple.  No bruit. 

Chest:  Reveals some rales on both bases. 

Cardiac:  Revealed an S3 gallops.  Regular rhythm and rate. 

Abdomen:  Benign. 

Extremities:  Revealed no clubbing, cyanosis.  He had 1+ edema.



Diagnostic Data:  His CPKs, MBs, and troponin were negative.  Magnesium was 1.6, creatinine 1.67 down
 to 1.14, glucose was 143.  Procalcitonin was 15.59.



Impression And Plan:  Chronic systolic congestive heart failure.  Patient needs to be on Coreg, needs
 to be on Lasix, needs to be on ACE inhibitor.  He really should have a stress test as an outpatient 
and possibly heart catheterization to evaluate his coronary anatomy.  I am not so sure he is going to
 be very compliant with his medication.  He needs to watch his salt intake, quit alcohol completely. 
 I agree with his antibiotics regimen for now.  His magnesium is being supplemented.  His kidney func
tion we need to watch carefully.  His diabetes is fairly well controlled and so is his hypertension. 
 We will continue to follow him.





NB/JAVY

DD:  09/09/2019 20:27:30Voice ID:  [id number]

DT:  09/10/2019 02:05:00Report ID:  [id number]

## 2019-09-10 NOTE — PN
Reason For Consultation:  Chronic systolic congestive heart failure with an ejection fraction about 1
5%.  Apparently, this has been a chronic issue since at least 2017.  Patient had been lost to follow 
up.  He is feeling much better today after diuresis.  He should definitely go home on carvedilol, ACE
 inhibitor, low-dose Lasix, watch his salt intake.  Patient needs to follow up with the cardiologist 
in the next 2 weeks and have close followup and further care.  Certainly doing a Lexiscan later and m
ay be a catheterization would be reasonable.  He may be a candidate for defibrillator and pacemaker d
own the road.  All this was discussed with the patient.  He will make a decision after he goes home.





NB/JAVY

DD:  09/10/2019 12:37:15Voice ID:  [id number]

DT:  09/10/2019 16:58:26Report ID:  [id number]

## 2019-09-10 NOTE — ECHO
HEIGHT: 5 ft 6 in   WEIGHT: 165 lb 9.6 oz   DATE OF STUDY: 09/09/2019   REFER DR: Lucas Coronado DO

2-DIMENSIONAL: YES

     M.MODE: YES

 DOPPLER: YES

COLOR FLOW: YES



                    TDS:  NO

PORTABLE: NO

 DEFINITY:  NO

BUBBLE STUDY: NO





DIAGNOSIS:  SHORTNESS OF BREATH



CARDIAC HISTORY:  

CATHERIZATION: NO

SURGERY: NO

PROSTHETIC VALVE: NO

PACEMAKER: NO





MEASUREMENTS (cm)

    DIASTOLIC (NORMALS)                 SYSTOLIC (NORMALS)

IVSd                 1.1 (0.6-1.2)                    LA Diam 4.4 (1.9-4.0)     LVEF       
  25-30%  

LVIDd               5.5 (3.5-5.7)                        LVIDs      5.0 (2.0-3.5)     %FS  
        10%

LVPWd             1.1 (0.6-1.2)

Ao Diam           2.6 (2.0-3.7)



2 DIMENSIONAL ASSESSMENT:

RIGHT ATRIUM:                   NORMAL

LEFT ATRIUM:       NORMAL



RIGHT VENTRICLE:            NORMAL

LEFT VENTRICLE: NORMAL



TRICUSPID VALVE:             NORMAL

MITRAL VALVE:     NORMAL



PULMONIC VALVE:             NORMAL

AORTIC VALVE:     NORMAL



PERICARDIAL EFFUSION: NONE

AORTIC ROOT:      NORMAL





LEFT VENTRICULAR WALL MOTION:     SEVERE GLOBAL HYPOKINESIS.



DOPPLER/COLOR FLOW:     MILD MITRAL AND TRICUSPID REGURGITATION. 



COMMENTS:      SEVERE GLOBAL HYPOKINESIS. NORMAL LEFT VENTRICULAR EJECTION FRACTION 
25-30%. LEFT ATRIAL ENLARGEMENT. NO THROMBUS. MILD MITRAL AND TRICUSPID REGURGITATION.



TECHNOLOGIST:   BHARGAVI WHITTINGTON